# Patient Record
Sex: MALE | Race: WHITE | Employment: OTHER | URBAN - METROPOLITAN AREA
[De-identification: names, ages, dates, MRNs, and addresses within clinical notes are randomized per-mention and may not be internally consistent; named-entity substitution may affect disease eponyms.]

---

## 2020-10-02 ENCOUNTER — HOSPITAL ENCOUNTER (OUTPATIENT)
Dept: RADIATION ONCOLOGY | Age: 75
Discharge: HOME | End: 2020-10-02
Payer: MEDICARE

## 2020-10-02 VITALS
WEIGHT: 191.4 LBS | HEIGHT: 69 IN | RESPIRATION RATE: 16 BRPM | OXYGEN SATURATION: 96 % | BODY MASS INDEX: 28.35 KG/M2 | SYSTOLIC BLOOD PRESSURE: 155 MMHG | HEART RATE: 69 BPM | TEMPERATURE: 97.4 F | DIASTOLIC BLOOD PRESSURE: 78 MMHG

## 2020-10-02 PROBLEM — C61 ADENOCARCINOMA OF PROSTATE (HCC): Status: ACTIVE | Noted: 2020-10-02

## 2020-10-02 PROCEDURE — 99203 OFFICE O/P NEW LOW 30 MIN: CPT

## 2020-10-02 PROCEDURE — 99205 OFFICE O/P NEW HI 60 MIN: CPT | Performed by: RADIOLOGY

## 2020-10-02 ASSESSMENT — PAIN SCALES - GENERAL: PAINLEVEL_OUTOF10: 0

## 2020-10-02 NOTE — PROGRESS NOTES
Danielle Hernandez  10/2/2020    CONSULT / Prostate Cancer    Vitals:    10/02/20 0951   BP: (!) 155/78   Pulse: 69   Resp: 16   Temp: 97.4 °F (36.3 °C)   SpO2: 96%      Wt Readings from Last 3 Encounters:   10/02/20 191 lb 6.4 oz (86.8 kg)   03/02/15 190 lb 12.8 oz (86.5 kg)   02/25/15 185 lb (83.9 kg)        Pain Assessment  Pain Assessment: 0-10  Pain Level: 0  Patient's Stated Pain Goal: No pain  Multiple Pain Sites: No  Denies Need for Intervention    Fall Risk:    Not currently a fall risk  Re-Evaluate Weekly    Nutritional Alteration:  None  No Difficulties Swallowing  Voices Sufficient Oral Intake    Mediport: no    Pacemaker/ICD: no    Implants: no    Previous XRT: no    Assessment: Pt here to discuss radiation treatment options for prostate cancer. Pt reports elevated PSA, had biopsy 3/18/2019 with continued observation. Pt states PSA remained stable for a while then started to rise again. Pt had repeat biopsy 8/20/2020 and  sent here for tx options. Pt denies any urinary complaints, nocturia x 1 since bx. Reports daily bowel movements. Denies c/o pain. Spouse at bedside. Past Medical History:   Diagnosis Date    Cancer ([facility] [address]     basal cell    Hyperlipidemia        Past Surgical History:   Procedure Laterality Date    PROSTATE BIOPSY  03/18/2019    PROSTATE BIOPSY  08/20/2020    SKIN BIOPSY         No Known Allergies       Current Outpatient Medications:     Cholecalciferol (VITAMIN D3) 1.25 MG (00234 UT) TABS, Take 1 tablet by mouth daily, Disp: , Rfl:     simvastatin (ZOCOR) 80 MG tablet, Take 80 mg by mouth nightly., Disp: , Rfl:     aspirin 325 MG tablet, Take 325 mg by mouth daily. , Disp: , Rfl:     Omega-3 Fatty Acids (FISH OIL) 1000 MG CAPS, Take 3,000 mg by mouth daily. , Disp: , Rfl:     ADDITIONAL COMMENTS: Spouse at bedside    Electronically signed by Lindsey Dubin, RN on 10/2/2020 at 10:01 AM

## 2020-10-02 NOTE — PLAN OF CARE
Radiation education and handouts given. Side effects and management reviewed with pt. All questions answered and pt voices understanding . Nursing Care Plan for Prostate Radiation    Pain related to cancer diagnosis and treatment. Interventions   Pain assessment. Monitor pharmacological pain medication. Teach relaxation and repositioning techniques. Expected Outcome   Maintain patient's acceptable pain level or <5 on pain scale. Knowledge deficit related to diagnosis and treatment plan. Interventions   Assess patient's ability to comprehend diagnosis and treatment plan. Provide educational materials and teaching regarding plan of care. Provide emotional support and continued education. Refer to psychosocial coordinator if further assistance needed. Expected Outcome   Patient voices understanding of diagnosis and treatment plan. Altered skin integrity related to treatment. Interventions   Evaluation of skin integrity at therapy site. Advise patient on appropriate skin care. Instruct patient on recommended lotions/ointments/creams/dressing changes to use on therapy site. Expected Outcome   Minimize adverse skin reaction/infection at treatment site. Altered genitourinary function. Interventions   Evaluate for treatment side effects. Evaluate treatment modalities for effectiveness. Monitor I & O. Expected Outcome   Patient with minimal urinary complications as evidenced by adequate urinary output. Gastrointestinal disturbances due to treatment process. Interventions   Evaluate for treatment side effects. Evaluate treatment modalities for effectiveness. Initiate and educate on appropriate bowel program if needed. Expected Outcome   Patient with minimal bowel complications and controlled management of side effects. To re-evaluate during OTV and one month post radiation treatments.

## 2020-10-02 NOTE — PROGRESS NOTES
PROSTATE BIOPSY  03/18/2019    PROSTATE BIOPSY  08/20/2020    SKIN BIOPSY         History reviewed. No pertinent family history. Social History     Socioeconomic History    Marital status:      Spouse name: Not on file    Number of children: Not on file    Years of education: Not on file    Highest education level: Not on file   Occupational History    Not on file   Social Needs    Financial resource strain: Not on file    Food insecurity     Worry: Not on file     Inability: Not on file    Transportation needs     Medical: Not on file     Non-medical: Not on file   Tobacco Use    Smoking status: Never Smoker    Smokeless tobacco: Never Used   Substance and Sexual Activity    Alcohol use: Yes    Drug use: No    Sexual activity: Not on file   Lifestyle    Physical activity     Days per week: Not on file     Minutes per session: Not on file    Stress: Not on file   Relationships    Social connections     Talks on phone: Not on file     Gets together: Not on file     Attends [employer] service: Not on file     Active member of club or organization: Not on file     Attends meetings of clubs or organizations: Not on file     Relationship status: Not on file    Intimate partner violence     Fear of current or ex partner: Not on file     Emotionally abused: Not on file     Physically abused: Not on file     Forced sexual activity: Not on file   Other Topics Concern    Not on file   Social History Narrative    Not on file         ALLERGIES: No Known Allergies        Medication List      ASK your doctor about these medications     aspirin 325 MG tablet   fish oil 1000 MG Caps   simvastatin 80 MG tablet; Commonly known as: ZOCOR   Vitamin D3 1.25 MG (56631 UT) Tabs           REVIEW OF SYSTEMS:   Pertinents as in HPI, the remainder of the 14-point ROS is otherwise negative and has been signed and included in the medical record.       PHYSICAL EXAMINATION:   VITAL SIGNS: BP (!) 155/78   Pulse 69   Temp 97.4 °F (36.3 °C) (Tympanic)   Resp 16   Ht 5' 9\" (1.753 m)   Wt 191 lb 6.4 oz (86.8 kg)   SpO2 96%   BMI 28.26 kg/m²   KARNOFSKY PERFORMANCE STATUS: 100    PAIN RATIN    A&Ox3, NAD  Sclera anicteric, EOMI, no temporalis muscle wasting  No cervical, SCV, inguinofemoral LAD  Respirations unlabored  Abd soft, NTND, no HSM  No spinal, paraspinal, or other bony tenderness to palpation  No UE/LE edema  Normal mood, affect, judgement  : nl penis with bilaterally descended testes without masses, no inguinal hernias  Rectal: nl sphincter tone, prostate smooth, no rectal masses or blood      LABORATORY STUDIES:     PSA History:  4.8 -   5.6 -       RADIOLOGIC STUDIES:   None for review      ASSESSMENT/PLAN:  81yo man with favorable intermediate risk prostate cancer  We had a long discussion today regarding current diagnosis, prognosis, and treatment options. We reviewed NCCN guidelines and discussed continued active surveillance, EBRT vs Brachytherapy, and Surgical treatments. Patient wishes to have non-invasive treatment so we spent the majority of time discussing external radiation. I discussed the logistics of planning and delivering radiation treatments along with anticipated potential acute and chronic toxicities including but not limited to: Fatigue, skin erythema, desquamation, dysuria, hematuria, increased urinary urgency/frequency, nocturia, radiation proctitis, rectal bleeding, rectal injury, diarrhea, erectile dysfunction, long-term risks of bowel obstruction and fistula formation, risk of secondary malignancy. Questions answered to patient's satisfaction and they wish to proceed as planned. CT simulation scheduled in near future to begin planning process. Anticipated tx is 5400 cGy/30 fx to prostate/SV followed by a 2520 cGy/14 fx boost to the prostate alone using IMRT/IGRT with daily CBCT.     Thank-you for allowing me to participate in the care of this patient and please do not hesitate to contact me for any questions or concerns. TIME SPENT: 60 minutes spent during this consultation,  >50% spent in counseling/discussion/education.       Electronically signed by Electronically signed by Joaquina Fermin MD on 10/2/2020 at 10:48 AM

## 2020-10-07 ENCOUNTER — HOSPITAL ENCOUNTER (OUTPATIENT)
Dept: RADIATION ONCOLOGY | Age: 75
Discharge: HOME | End: 2020-10-07
Attending: RADIOLOGY
Payer: MEDICARE

## 2020-10-07 PROCEDURE — 77470 SPECIAL RADIATION TREATMENT: CPT | Performed by: RADIOLOGY

## 2020-10-07 PROCEDURE — 77290 THER RAD SIMULAJ FIELD CPLX: CPT | Performed by: RADIOLOGY

## 2020-10-07 PROCEDURE — 77334 RADIATION TREATMENT AID(S): CPT | Performed by: RADIOLOGY

## 2020-10-07 PROCEDURE — 99999 PR OFFICE/OUTPT VISIT,PROCEDURE ONLY: CPT | Performed by: RADIOLOGY

## 2020-10-07 PROCEDURE — 77263 THER RADIOLOGY TX PLNG CPLX: CPT | Performed by: RADIOLOGY

## 2020-10-12 PROCEDURE — 77301 RADIOTHERAPY DOSE PLAN IMRT: CPT | Performed by: RADIOLOGY

## 2020-10-12 PROCEDURE — 77338 DESIGN MLC DEVICE FOR IMRT: CPT | Performed by: RADIOLOGY

## 2020-10-15 ENCOUNTER — HOSPITAL ENCOUNTER (OUTPATIENT)
Dept: RADIATION ONCOLOGY | Age: 75
Discharge: HOME | End: 2020-10-15
Attending: RADIOLOGY
Payer: MEDICARE

## 2020-10-15 PROCEDURE — 77014 PR CT GUIDANCE PLACEMENT RAD THERAPY FIELDS: CPT | Performed by: RADIOLOGY

## 2020-10-15 PROCEDURE — 77385 HC NTSTY MODUL RAD TX DLVR SMPL: CPT | Performed by: RADIOLOGY

## 2020-10-15 PROCEDURE — 77300 RADIATION THERAPY DOSE PLAN: CPT | Performed by: RADIOLOGY

## 2020-10-16 ENCOUNTER — HOSPITAL ENCOUNTER (OUTPATIENT)
Dept: RADIATION ONCOLOGY | Age: 75
Discharge: HOME | End: 2020-10-16
Attending: RADIOLOGY
Payer: MEDICARE

## 2020-10-16 PROCEDURE — 77385 HC NTSTY MODUL RAD TX DLVR SMPL: CPT | Performed by: RADIOLOGY

## 2020-10-16 PROCEDURE — 77014 PR CT GUIDANCE PLACEMENT RAD THERAPY FIELDS: CPT | Performed by: RADIOLOGY

## 2020-10-19 ENCOUNTER — HOSPITAL ENCOUNTER (OUTPATIENT)
Dept: RADIATION ONCOLOGY | Age: 75
Discharge: HOME | End: 2020-10-19
Attending: RADIOLOGY
Payer: MEDICARE

## 2020-10-19 VITALS — BODY MASS INDEX: 28.12 KG/M2 | WEIGHT: 190.4 LBS

## 2020-10-19 PROCEDURE — 77385 HC NTSTY MODUL RAD TX DLVR SMPL: CPT | Performed by: RADIOLOGY

## 2020-10-19 PROCEDURE — 77014 PR CT GUIDANCE PLACEMENT RAD THERAPY FIELDS: CPT | Performed by: RADIOLOGY

## 2020-10-19 ASSESSMENT — PAIN SCALES - GENERAL: PAINLEVEL_OUTOF10: 0

## 2020-10-20 ENCOUNTER — HOSPITAL ENCOUNTER (OUTPATIENT)
Dept: RADIATION ONCOLOGY | Age: 75
Discharge: HOME | End: 2020-10-20
Attending: RADIOLOGY
Payer: MEDICARE

## 2020-10-20 PROCEDURE — 77014 PR CT GUIDANCE PLACEMENT RAD THERAPY FIELDS: CPT | Performed by: RADIOLOGY

## 2020-10-20 PROCEDURE — 77385 HC NTSTY MODUL RAD TX DLVR SMPL: CPT | Performed by: RADIOLOGY

## 2020-10-21 ENCOUNTER — HOSPITAL ENCOUNTER (OUTPATIENT)
Dept: RADIATION ONCOLOGY | Age: 75
Discharge: HOME | End: 2020-10-21
Attending: RADIOLOGY
Payer: MEDICARE

## 2020-10-21 PROCEDURE — 77336 RADIATION PHYSICS CONSULT: CPT | Performed by: RADIOLOGY

## 2020-10-21 PROCEDURE — 77014 PR CT GUIDANCE PLACEMENT RAD THERAPY FIELDS: CPT | Performed by: RADIOLOGY

## 2020-10-21 PROCEDURE — 77385 HC NTSTY MODUL RAD TX DLVR SMPL: CPT | Performed by: RADIOLOGY

## 2020-10-21 PROCEDURE — 77427 RADIATION TX MANAGEMENT X5: CPT | Performed by: RADIOLOGY

## 2020-10-22 ENCOUNTER — HOSPITAL ENCOUNTER (OUTPATIENT)
Dept: RADIATION ONCOLOGY | Age: 75
Discharge: HOME | End: 2020-10-22
Attending: RADIOLOGY
Payer: MEDICARE

## 2020-10-22 PROCEDURE — 77385 HC NTSTY MODUL RAD TX DLVR SMPL: CPT | Performed by: RADIOLOGY

## 2020-10-22 PROCEDURE — 77014 PR CT GUIDANCE PLACEMENT RAD THERAPY FIELDS: CPT | Performed by: RADIOLOGY

## 2020-10-23 ENCOUNTER — HOSPITAL ENCOUNTER (OUTPATIENT)
Dept: RADIATION ONCOLOGY | Age: 75
Discharge: HOME | End: 2020-10-23
Attending: RADIOLOGY
Payer: MEDICARE

## 2020-10-23 PROCEDURE — 77014 PR CT GUIDANCE PLACEMENT RAD THERAPY FIELDS: CPT | Performed by: RADIOLOGY

## 2020-10-23 PROCEDURE — 77385 HC NTSTY MODUL RAD TX DLVR SMPL: CPT | Performed by: RADIOLOGY

## 2020-10-26 ENCOUNTER — HOSPITAL ENCOUNTER (OUTPATIENT)
Dept: RADIATION ONCOLOGY | Age: 75
Discharge: HOME | End: 2020-10-26
Attending: RADIOLOGY
Payer: MEDICARE

## 2020-10-26 VITALS — BODY MASS INDEX: 27.73 KG/M2 | WEIGHT: 187.8 LBS

## 2020-10-26 PROCEDURE — 77014 PR CT GUIDANCE PLACEMENT RAD THERAPY FIELDS: CPT | Performed by: RADIOLOGY

## 2020-10-26 PROCEDURE — 77385 HC NTSTY MODUL RAD TX DLVR SMPL: CPT | Performed by: RADIOLOGY

## 2020-10-26 ASSESSMENT — PAIN SCALES - GENERAL: PAINLEVEL_OUTOF10: 0

## 2020-10-26 NOTE — PROGRESS NOTES
Weekly Radiation Treatment Progress Note    DATE OF SERVICE: 10/26/2020     DIAGNOSIS:  Cancer Staging  Adenocarcinoma of prostate [facility])  Staging form: Prostate, AJCC 8th Edition  - Clinical: Stage IIB (cT1c, cN0, cM0, PSA: 5.6, Grade Group: 2) - Unsigned       TREATMENT COURSE:   Oncology History   Adenocarcinoma of prostate ([facility] [address]   3/18/2019 Initial Diagnosis    Adenocarcinoma of prostate (HCC)    TRUS Bx  [place] 3+3=6 adenocarcinoma, 5/12 cores, 6% submitted tissue     8/20/2020 Biopsy    TRUS Bx  Single core with [place] 3+4=7 adenocarcinoma at right lateral base  Remainder showing [place] 3+3=6  6/15 cores, 8% submitted tissue       10/15/2020 -  Radiation    Prostate/SV  Dual Arc VMAT-IMRT using 6MV photons  180 cGy x 30 = 5400 cGy    Prostate [place]  Dual arc VMAT-IMRT using 6MV photons  180 cGy x 14 = 2520 cGy (7920 cGy cumulative dose)           Site: Prostate  Current Radiation Dose: 1440 cGy    Subjective: Increased urinary frequency through the day, nocturia 2-3x/night (baseline is 1x), and hesitancy  No dysuria  No diarrhea    EXAM  There were no vitals filed for this visit.   NAD    Setup images, chart, plan reviewed      A/P:   Tolerating tx well  Continue RT as planned    Pt does not want medical intervention for minor symptoms at this time      Electronically signed by Hoa Ernst MD on 10/26/2020 at 11:42 AM

## 2020-10-27 ENCOUNTER — HOSPITAL ENCOUNTER (OUTPATIENT)
Dept: RADIATION ONCOLOGY | Age: 75
Discharge: HOME | End: 2020-10-27
Attending: RADIOLOGY
Payer: MEDICARE

## 2020-10-27 PROCEDURE — 77385 HC NTSTY MODUL RAD TX DLVR SMPL: CPT | Performed by: RADIOLOGY

## 2020-10-27 PROCEDURE — 77014 PR CT GUIDANCE PLACEMENT RAD THERAPY FIELDS: CPT | Performed by: RADIOLOGY

## 2020-10-28 ENCOUNTER — HOSPITAL ENCOUNTER (OUTPATIENT)
Dept: RADIATION ONCOLOGY | Age: 75
Discharge: HOME | End: 2020-10-28
Attending: RADIOLOGY
Payer: MEDICARE

## 2020-10-28 PROCEDURE — 77014 PR CT GUIDANCE PLACEMENT RAD THERAPY FIELDS: CPT | Performed by: RADIOLOGY

## 2020-10-28 PROCEDURE — 77427 RADIATION TX MANAGEMENT X5: CPT | Performed by: RADIOLOGY

## 2020-10-28 PROCEDURE — 77385 HC NTSTY MODUL RAD TX DLVR SMPL: CPT | Performed by: RADIOLOGY

## 2020-10-28 PROCEDURE — 77336 RADIATION PHYSICS CONSULT: CPT | Performed by: RADIOLOGY

## 2020-10-29 ENCOUNTER — HOSPITAL ENCOUNTER (OUTPATIENT)
Dept: RADIATION ONCOLOGY | Age: 75
Discharge: HOME | End: 2020-10-29
Attending: RADIOLOGY
Payer: MEDICARE

## 2020-10-29 PROCEDURE — 77014 PR CT GUIDANCE PLACEMENT RAD THERAPY FIELDS: CPT | Performed by: RADIOLOGY

## 2020-10-29 PROCEDURE — 77385 HC NTSTY MODUL RAD TX DLVR SMPL: CPT | Performed by: RADIOLOGY

## 2020-10-30 ENCOUNTER — HOSPITAL ENCOUNTER (OUTPATIENT)
Dept: RADIATION ONCOLOGY | Age: 75
Discharge: HOME | End: 2020-10-30
Attending: RADIOLOGY
Payer: MEDICARE

## 2020-10-30 PROCEDURE — 77385 HC NTSTY MODUL RAD TX DLVR SMPL: CPT | Performed by: RADIOLOGY

## 2020-10-30 PROCEDURE — 77014 PR CT GUIDANCE PLACEMENT RAD THERAPY FIELDS: CPT | Performed by: RADIOLOGY

## 2020-11-02 ENCOUNTER — HOSPITAL ENCOUNTER (OUTPATIENT)
Dept: RADIATION ONCOLOGY | Age: 75
Discharge: HOME | End: 2020-11-02
Attending: RADIOLOGY
Payer: MEDICARE

## 2020-11-02 VITALS — BODY MASS INDEX: 27.85 KG/M2 | WEIGHT: 188.6 LBS

## 2020-11-02 PROCEDURE — 77014 PR CT GUIDANCE PLACEMENT RAD THERAPY FIELDS: CPT | Performed by: RADIOLOGY

## 2020-11-02 PROCEDURE — 77385 HC NTSTY MODUL RAD TX DLVR SMPL: CPT | Performed by: RADIOLOGY

## 2020-11-02 ASSESSMENT — PAIN SCALES - GENERAL: PAINLEVEL_OUTOF10: 0

## 2020-11-02 NOTE — PROGRESS NOTES
Weekly Radiation Treatment Progress Note    DATE OF SERVICE: 11/2/2020     DIAGNOSIS:  Cancer Staging  Adenocarcinoma of prostate [facility])  Staging form: Prostate, AJCC 8th Edition  - Clinical: Stage IIB (cT1c, cN0, cM0, PSA: 5.6, Grade Group: 2) - Unsigned       TREATMENT COURSE:   Oncology History   Adenocarcinoma of prostate ([facility] [address]   3/18/2019 Initial Diagnosis    Adenocarcinoma of prostate (HCC)    TRUS Bx  [place] 3+3=6 adenocarcinoma, 5/12 cores, 6% submitted tissue     8/20/2020 Biopsy    TRUS Bx  Single core with [place] 3+4=7 adenocarcinoma at right lateral base  Remainder showing [place] 3+3=6  6/15 cores, 8% submitted tissue       10/15/2020 -  Radiation    Prostate/SV  Dual Arc VMAT-IMRT using 6MV photons  180 cGy x 30 = 5400 cGy    Prostate [place]  Dual arc VMAT-IMRT using 6MV photons  180 cGy x 14 = 2520 cGy (7920 cGy cumulative dose)           Site: prostate  Current Radiation Dose: 2340 cGy    Subjective:    Weak urinary stream but not bothersome  No other LUTS      EXAM  There were no vitals filed for this visit.   NAD    Setup images, chart, plan reviewed      A/P:   Tolerating tx well  Continue RT as planned    Discussed flomax but he does not want to start additional meds at this point      Electronically signed by Ryne Amezquita MD on 11/2/2020 at 11:45 AM

## 2020-11-03 ENCOUNTER — HOSPITAL ENCOUNTER (OUTPATIENT)
Dept: RADIATION ONCOLOGY | Age: 75
Discharge: HOME | End: 2020-11-03
Attending: RADIOLOGY
Payer: MEDICARE

## 2020-11-03 PROCEDURE — 77385 HC NTSTY MODUL RAD TX DLVR SMPL: CPT | Performed by: RADIOLOGY

## 2020-11-03 PROCEDURE — 77014 PR CT GUIDANCE PLACEMENT RAD THERAPY FIELDS: CPT | Performed by: RADIOLOGY

## 2020-11-04 ENCOUNTER — HOSPITAL ENCOUNTER (OUTPATIENT)
Dept: RADIATION ONCOLOGY | Age: 75
Discharge: HOME | End: 2020-11-04
Attending: RADIOLOGY
Payer: MEDICARE

## 2020-11-04 PROCEDURE — 77385 HC NTSTY MODUL RAD TX DLVR SMPL: CPT | Performed by: RADIOLOGY

## 2020-11-04 PROCEDURE — 77336 RADIATION PHYSICS CONSULT: CPT | Performed by: RADIOLOGY

## 2020-11-04 PROCEDURE — 77014 PR CT GUIDANCE PLACEMENT RAD THERAPY FIELDS: CPT | Performed by: RADIOLOGY

## 2020-11-04 PROCEDURE — 77427 RADIATION TX MANAGEMENT X5: CPT | Performed by: RADIOLOGY

## 2020-11-05 ENCOUNTER — HOSPITAL ENCOUNTER (OUTPATIENT)
Dept: RADIATION ONCOLOGY | Age: 75
Discharge: HOME | End: 2020-11-05
Attending: RADIOLOGY
Payer: MEDICARE

## 2020-11-05 PROCEDURE — 77385 HC NTSTY MODUL RAD TX DLVR SMPL: CPT | Performed by: RADIOLOGY

## 2020-11-05 PROCEDURE — 77014 PR CT GUIDANCE PLACEMENT RAD THERAPY FIELDS: CPT | Performed by: RADIOLOGY

## 2020-11-06 ENCOUNTER — HOSPITAL ENCOUNTER (OUTPATIENT)
Dept: RADIATION ONCOLOGY | Age: 75
Discharge: HOME | End: 2020-11-06
Attending: RADIOLOGY
Payer: MEDICARE

## 2020-11-06 PROCEDURE — 77385 HC NTSTY MODUL RAD TX DLVR SMPL: CPT | Performed by: RADIOLOGY

## 2020-11-06 PROCEDURE — 77014 PR CT GUIDANCE PLACEMENT RAD THERAPY FIELDS: CPT | Performed by: RADIOLOGY

## 2020-11-09 ENCOUNTER — HOSPITAL ENCOUNTER (OUTPATIENT)
Dept: RADIATION ONCOLOGY | Age: 75
Discharge: HOME | End: 2020-11-09
Attending: RADIOLOGY
Payer: MEDICARE

## 2020-11-09 VITALS — WEIGHT: 187 LBS | BODY MASS INDEX: 27.62 KG/M2

## 2020-11-09 PROCEDURE — 77385 HC NTSTY MODUL RAD TX DLVR SMPL: CPT | Performed by: RADIOLOGY

## 2020-11-09 PROCEDURE — 77014 PR CT GUIDANCE PLACEMENT RAD THERAPY FIELDS: CPT | Performed by: RADIOLOGY

## 2020-11-09 ASSESSMENT — PAIN SCALES - GENERAL: PAINLEVEL_OUTOF10: 0

## 2020-11-09 NOTE — PROGRESS NOTES
Weekly Radiation Treatment Progress Note    DATE OF SERVICE: 11/9/2020     DIAGNOSIS:  Cancer Staging  Adenocarcinoma of prostate [facility])  Staging form: Prostate, AJCC 8th Edition  - Clinical: Stage IIB (cT1c, cN0, cM0, PSA: 5.6, Grade Group: 2) - Unsigned       TREATMENT COURSE:   Oncology History   Adenocarcinoma of prostate ([facility] [address]   3/18/2019 Initial Diagnosis    Adenocarcinoma of prostate (HCC)    TRUS Bx  [place] 3+3=6 adenocarcinoma, 5/12 cores, 6% submitted tissue     8/20/2020 Biopsy    TRUS Bx  Single core with [place] 3+4=7 adenocarcinoma at right lateral base  Remainder showing [place] 3+3=6  6/15 cores, 8% submitted tissue       10/15/2020 -  Radiation    Prostate/SV  Dual Arc VMAT-IMRT using 6MV photons  180 cGy x 30 = 5400 cGy    Prostate [place]  Dual arc VMAT-IMRT using 6MV photons  180 cGy x 14 = 2520 cGy (7920 cGy cumulative dose)           Site: Prostate/SV  Current Radiation Dose: 3240 cGy    Subjective:    Doing well without complaints including LUTS/diarrhea/pelvic pain    EXAM  There were no vitals filed for this visit.   NAD    Setup images, chart, plan reviewed    A/P:   Tolerating tx well  Continue RT as planned      Electronically signed by Silvia Salcido MD on 11/9/2020 at 11:36 AM

## 2020-11-10 ENCOUNTER — HOSPITAL ENCOUNTER (OUTPATIENT)
Dept: RADIATION ONCOLOGY | Age: 75
Discharge: HOME | End: 2020-11-10
Attending: RADIOLOGY
Payer: MEDICARE

## 2020-11-10 PROCEDURE — 77385 HC NTSTY MODUL RAD TX DLVR SMPL: CPT | Performed by: RADIOLOGY

## 2020-11-10 PROCEDURE — 77014 PR CT GUIDANCE PLACEMENT RAD THERAPY FIELDS: CPT | Performed by: RADIOLOGY

## 2020-11-11 ENCOUNTER — HOSPITAL ENCOUNTER (OUTPATIENT)
Dept: RADIATION ONCOLOGY | Age: 75
Discharge: HOME | End: 2020-11-11
Attending: RADIOLOGY
Payer: MEDICARE

## 2020-11-11 PROCEDURE — 77014 PR CT GUIDANCE PLACEMENT RAD THERAPY FIELDS: CPT | Performed by: RADIOLOGY

## 2020-11-11 PROCEDURE — 77385 HC NTSTY MODUL RAD TX DLVR SMPL: CPT | Performed by: RADIOLOGY

## 2020-11-11 PROCEDURE — 77336 RADIATION PHYSICS CONSULT: CPT | Performed by: RADIOLOGY

## 2020-11-11 PROCEDURE — 77427 RADIATION TX MANAGEMENT X5: CPT | Performed by: RADIOLOGY

## 2020-11-12 ENCOUNTER — HOSPITAL ENCOUNTER (OUTPATIENT)
Dept: RADIATION ONCOLOGY | Age: 75
Discharge: HOME | End: 2020-11-12
Attending: RADIOLOGY
Payer: MEDICARE

## 2020-11-12 PROCEDURE — 77385 HC NTSTY MODUL RAD TX DLVR SMPL: CPT | Performed by: RADIOLOGY

## 2020-11-12 PROCEDURE — 77014 PR CT GUIDANCE PLACEMENT RAD THERAPY FIELDS: CPT | Performed by: RADIOLOGY

## 2020-11-13 ENCOUNTER — HOSPITAL ENCOUNTER (OUTPATIENT)
Dept: RADIATION ONCOLOGY | Age: 75
Discharge: HOME | End: 2020-11-13
Attending: RADIOLOGY
Payer: MEDICARE

## 2020-11-13 PROCEDURE — 77014 PR CT GUIDANCE PLACEMENT RAD THERAPY FIELDS: CPT | Performed by: RADIOLOGY

## 2020-11-13 PROCEDURE — 77385 HC NTSTY MODUL RAD TX DLVR SMPL: CPT | Performed by: RADIOLOGY

## 2020-11-16 ENCOUNTER — HOSPITAL ENCOUNTER (OUTPATIENT)
Dept: RADIATION ONCOLOGY | Age: 75
Discharge: HOME | End: 2020-11-16
Attending: RADIOLOGY
Payer: MEDICARE

## 2020-11-16 VITALS — BODY MASS INDEX: 27.32 KG/M2 | WEIGHT: 185 LBS

## 2020-11-16 PROCEDURE — 77385 HC NTSTY MODUL RAD TX DLVR SMPL: CPT | Performed by: RADIOLOGY

## 2020-11-16 PROCEDURE — 77014 PR CT GUIDANCE PLACEMENT RAD THERAPY FIELDS: CPT | Performed by: RADIOLOGY

## 2020-11-16 ASSESSMENT — PAIN SCALES - GENERAL: PAINLEVEL_OUTOF10: 0

## 2020-11-16 NOTE — PROGRESS NOTES
Weekly Radiation Treatment Progress Note    DATE OF SERVICE: 11/16/2020     DIAGNOSIS:  Cancer Staging  Adenocarcinoma of prostate [facility])  Staging form: Prostate, AJCC 8th Edition  - Clinical: Stage IIB (cT1c, cN0, cM0, PSA: 5.6, Grade Group: 2) - Unsigned       TREATMENT COURSE:   Oncology History   Adenocarcinoma of prostate ([facility] [address]   3/18/2019 Initial Diagnosis    Adenocarcinoma of prostate (HCC)    TRUS Bx  [place] 3+3=6 adenocarcinoma, 5/12 cores, 6% submitted tissue     8/20/2020 Biopsy    TRUS Bx  Single core with [place] 3+4=7 adenocarcinoma at right lateral base  Remainder showing [place] 3+3=6  6/15 cores, 8% submitted tissue       10/15/2020 -  Radiation    Prostate/SV  Dual Arc VMAT-IMRT using 6MV photons  180 cGy x 30 = 5400 cGy    Prostate [place]  Dual arc VMAT-IMRT using 6MV photons  180 cGy x 14 = 2520 cGy (7920 cGy cumulative dose)           Site: Prostate  Current Total Radiation Dose: 4140 cGy    Pt doing well. Energy fairly good. Mild dysuria +/-. No hematuria.  Up 2-3x/pm  No diarrhea    EXAM  Wt Readings from Last 3 Encounters:   11/16/20 185 lb (83.9 kg)   11/09/20 187 lb (84.8 kg)   11/02/20 188 lb 9.6 oz (85.5 kg)     NAD    Setup images, chart, plan reviewed    A/P:   Tolerating RT well  Continue RT as planned      Electronically signed by Paul Spencer MD on 11/16/2020 at 11:33 AM

## 2020-11-17 ENCOUNTER — HOSPITAL ENCOUNTER (OUTPATIENT)
Dept: RADIATION ONCOLOGY | Age: 75
Discharge: HOME | End: 2020-11-17
Attending: RADIOLOGY
Payer: MEDICARE

## 2020-11-17 PROCEDURE — 77014 PR CT GUIDANCE PLACEMENT RAD THERAPY FIELDS: CPT | Performed by: RADIOLOGY

## 2020-11-17 PROCEDURE — 77385 HC NTSTY MODUL RAD TX DLVR SMPL: CPT | Performed by: RADIOLOGY

## 2020-11-18 ENCOUNTER — HOSPITAL ENCOUNTER (OUTPATIENT)
Dept: RADIATION ONCOLOGY | Age: 75
Discharge: HOME | End: 2020-11-18
Attending: RADIOLOGY
Payer: MEDICARE

## 2020-11-18 PROCEDURE — 77385 HC NTSTY MODUL RAD TX DLVR SMPL: CPT | Performed by: RADIOLOGY

## 2020-11-18 PROCEDURE — 77336 RADIATION PHYSICS CONSULT: CPT | Performed by: RADIOLOGY

## 2020-11-18 PROCEDURE — 77427 RADIATION TX MANAGEMENT X5: CPT | Performed by: RADIOLOGY

## 2020-11-18 PROCEDURE — 77014 PR CT GUIDANCE PLACEMENT RAD THERAPY FIELDS: CPT | Performed by: RADIOLOGY

## 2020-11-19 ENCOUNTER — HOSPITAL ENCOUNTER (OUTPATIENT)
Dept: RADIATION ONCOLOGY | Age: 75
Discharge: HOME | End: 2020-11-19
Attending: RADIOLOGY
Payer: MEDICARE

## 2020-11-19 PROCEDURE — 77385 HC NTSTY MODUL RAD TX DLVR SMPL: CPT | Performed by: RADIOLOGY

## 2020-11-19 PROCEDURE — 77014 PR CT GUIDANCE PLACEMENT RAD THERAPY FIELDS: CPT | Performed by: RADIOLOGY

## 2020-11-20 ENCOUNTER — HOSPITAL ENCOUNTER (OUTPATIENT)
Dept: RADIATION ONCOLOGY | Age: 75
Discharge: HOME | End: 2020-11-20
Attending: RADIOLOGY
Payer: MEDICARE

## 2020-11-20 PROCEDURE — 77014 PR CT GUIDANCE PLACEMENT RAD THERAPY FIELDS: CPT | Performed by: RADIOLOGY

## 2020-11-20 PROCEDURE — 77338 DESIGN MLC DEVICE FOR IMRT: CPT | Performed by: RADIOLOGY

## 2020-11-20 PROCEDURE — 77385 HC NTSTY MODUL RAD TX DLVR SMPL: CPT | Performed by: RADIOLOGY

## 2020-11-23 ENCOUNTER — HOSPITAL ENCOUNTER (OUTPATIENT)
Dept: RADIATION ONCOLOGY | Age: 75
Discharge: HOME | End: 2020-11-23
Attending: RADIOLOGY
Payer: MEDICARE

## 2020-11-23 VITALS — WEIGHT: 186.8 LBS | BODY MASS INDEX: 27.59 KG/M2

## 2020-11-23 PROCEDURE — 77300 RADIATION THERAPY DOSE PLAN: CPT | Performed by: RADIOLOGY

## 2020-11-23 PROCEDURE — 77014 PR CT GUIDANCE PLACEMENT RAD THERAPY FIELDS: CPT | Performed by: RADIOLOGY

## 2020-11-23 PROCEDURE — 77338 DESIGN MLC DEVICE FOR IMRT: CPT | Performed by: RADIOLOGY

## 2020-11-23 PROCEDURE — 77385 HC NTSTY MODUL RAD TX DLVR SMPL: CPT | Performed by: RADIOLOGY

## 2020-11-23 ASSESSMENT — PAIN SCALES - GENERAL: PAINLEVEL_OUTOF10: 0

## 2020-11-23 NOTE — PROGRESS NOTES
Weekly Radiation Treatment Progress Note    DATE OF SERVICE: 11/23/2020     DIAGNOSIS:  Cancer Staging  Adenocarcinoma of prostate [facility])  Staging form: Prostate, AJCC 8th Edition  - Clinical: Stage IIB (cT1c, cN0, cM0, PSA: 5.6, Grade Group: 2) - Unsigned       TREATMENT COURSE:   Oncology History   Adenocarcinoma of prostate ([facility] [address]   3/18/2019 Initial Diagnosis    Adenocarcinoma of prostate (HCC)    TRUS Bx  [place] 3+3=6 adenocarcinoma, 5/12 cores, 6% submitted tissue     8/20/2020 Biopsy    TRUS Bx  Single core with [place] 3+4=7 adenocarcinoma at right lateral base  Remainder showing [place] 3+3=6  6/15 cores, 8% submitted tissue       10/15/2020 -  Radiation    Prostate/SV  Dual Arc VMAT-IMRT using 6MV photons  180 cGy x 30 = 5400 cGy    Prostate [place]  Dual arc VMAT-IMRT using 6MV photons  180 cGy x 14 = 2520 cGy (7920 cGy cumulative dose)           Site: Prostate/SV  Current Radiation Dose: 5040 cGy    Subjective:    Doing well, stable nocturia 2x/night  No other complaints    EXAM  There were no vitals filed for this visit.   NAD    Setup images, chart, plan reviewed    A/P:   Tolerating tx well  Continue RT as planned      Electronically signed by Tylor Christina MD on 11/23/2020 at 11:41 AM

## 2020-11-24 ENCOUNTER — HOSPITAL ENCOUNTER (OUTPATIENT)
Dept: RADIATION ONCOLOGY | Age: 75
Discharge: HOME | End: 2020-11-24
Attending: RADIOLOGY
Payer: MEDICARE

## 2020-11-24 PROCEDURE — 77014 PR CT GUIDANCE PLACEMENT RAD THERAPY FIELDS: CPT | Performed by: RADIOLOGY

## 2020-11-24 PROCEDURE — 77385 HC NTSTY MODUL RAD TX DLVR SMPL: CPT | Performed by: RADIOLOGY

## 2020-11-25 ENCOUNTER — HOSPITAL ENCOUNTER (OUTPATIENT)
Dept: RADIATION ONCOLOGY | Age: 75
Discharge: HOME | End: 2020-11-25
Attending: RADIOLOGY
Payer: MEDICARE

## 2020-11-25 PROCEDURE — 77336 RADIATION PHYSICS CONSULT: CPT | Performed by: RADIOLOGY

## 2020-11-25 PROCEDURE — 77385 HC NTSTY MODUL RAD TX DLVR SMPL: CPT | Performed by: RADIOLOGY

## 2020-11-25 PROCEDURE — 77014 PR CT GUIDANCE PLACEMENT RAD THERAPY FIELDS: CPT | Performed by: RADIOLOGY

## 2020-11-25 PROCEDURE — 77427 RADIATION TX MANAGEMENT X5: CPT | Performed by: RADIOLOGY

## 2020-11-27 ENCOUNTER — APPOINTMENT (OUTPATIENT)
Dept: RADIATION ONCOLOGY | Age: 75
End: 2020-11-27
Attending: RADIOLOGY
Payer: MEDICARE

## 2020-11-30 ENCOUNTER — HOSPITAL ENCOUNTER (OUTPATIENT)
Dept: RADIATION ONCOLOGY | Age: 75
Discharge: HOME | End: 2020-11-30
Attending: RADIOLOGY
Payer: MEDICARE

## 2020-11-30 PROCEDURE — 77385 HC NTSTY MODUL RAD TX DLVR SMPL: CPT | Performed by: RADIOLOGY

## 2020-11-30 PROCEDURE — 77300 RADIATION THERAPY DOSE PLAN: CPT | Performed by: RADIOLOGY

## 2020-11-30 PROCEDURE — 77014 PR CT GUIDANCE PLACEMENT RAD THERAPY FIELDS: CPT | Performed by: RADIOLOGY

## 2020-12-01 ENCOUNTER — HOSPITAL ENCOUNTER (OUTPATIENT)
Dept: RADIATION ONCOLOGY | Age: 75
Discharge: HOME | End: 2020-12-01
Attending: RADIOLOGY
Payer: MEDICARE

## 2020-12-01 PROCEDURE — 77014 PR CT GUIDANCE PLACEMENT RAD THERAPY FIELDS: CPT | Performed by: RADIOLOGY

## 2020-12-01 PROCEDURE — 77385 HC NTSTY MODUL RAD TX DLVR SMPL: CPT | Performed by: RADIOLOGY

## 2020-12-02 ENCOUNTER — HOSPITAL ENCOUNTER (OUTPATIENT)
Dept: RADIATION ONCOLOGY | Age: 75
Discharge: HOME | End: 2020-12-02
Attending: RADIOLOGY
Payer: MEDICARE

## 2020-12-02 PROCEDURE — 77385 HC NTSTY MODUL RAD TX DLVR SMPL: CPT | Performed by: RADIOLOGY

## 2020-12-02 PROCEDURE — 77014 PR CT GUIDANCE PLACEMENT RAD THERAPY FIELDS: CPT | Performed by: RADIOLOGY

## 2020-12-03 ENCOUNTER — HOSPITAL ENCOUNTER (OUTPATIENT)
Dept: RADIATION ONCOLOGY | Age: 75
Discharge: HOME | End: 2020-12-03
Attending: RADIOLOGY
Payer: MEDICARE

## 2020-12-03 PROCEDURE — 77014 PR CT GUIDANCE PLACEMENT RAD THERAPY FIELDS: CPT | Performed by: RADIOLOGY

## 2020-12-03 PROCEDURE — 77385 HC NTSTY MODUL RAD TX DLVR SMPL: CPT | Performed by: RADIOLOGY

## 2020-12-04 ENCOUNTER — HOSPITAL ENCOUNTER (OUTPATIENT)
Dept: RADIATION ONCOLOGY | Age: 75
Discharge: HOME | End: 2020-12-04
Attending: RADIOLOGY
Payer: MEDICARE

## 2020-12-04 PROCEDURE — 77427 RADIATION TX MANAGEMENT X5: CPT | Performed by: RADIOLOGY

## 2020-12-04 PROCEDURE — 77336 RADIATION PHYSICS CONSULT: CPT | Performed by: RADIOLOGY

## 2020-12-04 PROCEDURE — 77385 HC NTSTY MODUL RAD TX DLVR SMPL: CPT | Performed by: RADIOLOGY

## 2020-12-04 PROCEDURE — 77014 PR CT GUIDANCE PLACEMENT RAD THERAPY FIELDS: CPT | Performed by: RADIOLOGY

## 2020-12-07 ENCOUNTER — HOSPITAL ENCOUNTER (OUTPATIENT)
Dept: RADIATION ONCOLOGY | Age: 75
Discharge: HOME | End: 2020-12-07
Attending: RADIOLOGY
Payer: MEDICARE

## 2020-12-07 VITALS — WEIGHT: 185.8 LBS | BODY MASS INDEX: 27.44 KG/M2

## 2020-12-07 PROCEDURE — 77385 HC NTSTY MODUL RAD TX DLVR SMPL: CPT | Performed by: RADIOLOGY

## 2020-12-07 PROCEDURE — 77014 PR CT GUIDANCE PLACEMENT RAD THERAPY FIELDS: CPT | Performed by: RADIOLOGY

## 2020-12-07 ASSESSMENT — PAIN SCALES - GENERAL: PAINLEVEL_OUTOF10: 0

## 2020-12-07 NOTE — PLAN OF CARE
Care plan reviewed, no changes noted. _ reports nocturia 2 times per night but this is not new for him.

## 2020-12-07 NOTE — PROGRESS NOTES
Weekly Radiation Treatment Progress Note    DATE OF SERVICE: 12/7/2020     DIAGNOSIS:  Cancer Staging  Adenocarcinoma of prostate [facility])  Staging form: Prostate, AJCC 8th Edition  - Clinical: Stage IIB (cT1c, cN0, cM0, PSA: 5.6, Grade Group: 2) - Unsigned       TREATMENT COURSE:   Oncology History   Adenocarcinoma of prostate ([facility] [address]   3/18/2019 Initial Diagnosis    Adenocarcinoma of prostate (HCC)    TRUS Bx  [place] 3+3=6 adenocarcinoma, 5/12 cores, 6% submitted tissue     8/20/2020 Biopsy    TRUS Bx  Single core with [place] 3+4=7 adenocarcinoma at right lateral base  Remainder showing [place] 3+3=6  6/15 cores, 8% submitted tissue       10/15/2020 -  Radiation    Prostate/SV  Dual Arc VMAT-IMRT using 6MV photons  180 cGy x 30 = 5400 cGy    Prostate [place]  Dual arc VMAT-IMRT using 6MV photons  180 cGy x 14 = 2520 cGy (7920 cGy cumulative dose)           Site: prostate  Current Radiation Dose: 6480 cGy    Subjective:    Doing well with stable nocturia 2x/night    EXAM  There were no vitals filed for this visit.   NAD    Setup images, chart, plan reviewed    A/P:   Tolerating tx well  Continue RT as planned      Electronically signed by Demarcus Eng MD on 12/7/2020 at 11:54 AM

## 2020-12-08 ENCOUNTER — HOSPITAL ENCOUNTER (OUTPATIENT)
Dept: RADIATION ONCOLOGY | Age: 75
Discharge: HOME | End: 2020-12-08
Attending: RADIOLOGY
Payer: MEDICARE

## 2020-12-08 PROCEDURE — 77385 HC NTSTY MODUL RAD TX DLVR SMPL: CPT | Performed by: RADIOLOGY

## 2020-12-08 PROCEDURE — 77014 PR CT GUIDANCE PLACEMENT RAD THERAPY FIELDS: CPT | Performed by: RADIOLOGY

## 2020-12-09 ENCOUNTER — HOSPITAL ENCOUNTER (OUTPATIENT)
Dept: RADIATION ONCOLOGY | Age: 75
Discharge: HOME | End: 2020-12-09
Attending: RADIOLOGY
Payer: MEDICARE

## 2020-12-09 PROCEDURE — 77385 HC NTSTY MODUL RAD TX DLVR SMPL: CPT | Performed by: RADIOLOGY

## 2020-12-09 PROCEDURE — 77014 PR CT GUIDANCE PLACEMENT RAD THERAPY FIELDS: CPT | Performed by: RADIOLOGY

## 2020-12-10 ENCOUNTER — HOSPITAL ENCOUNTER (OUTPATIENT)
Dept: RADIATION ONCOLOGY | Age: 75
Discharge: HOME | End: 2020-12-10
Attending: RADIOLOGY
Payer: MEDICARE

## 2020-12-10 PROCEDURE — 77014 PR CT GUIDANCE PLACEMENT RAD THERAPY FIELDS: CPT | Performed by: RADIOLOGY

## 2020-12-10 PROCEDURE — 77385 HC NTSTY MODUL RAD TX DLVR SMPL: CPT | Performed by: RADIOLOGY

## 2020-12-11 ENCOUNTER — APPOINTMENT (OUTPATIENT)
Dept: RADIATION ONCOLOGY | Age: 75
End: 2020-12-11
Attending: RADIOLOGY
Payer: MEDICARE

## 2020-12-14 ENCOUNTER — HOSPITAL ENCOUNTER (OUTPATIENT)
Dept: RADIATION ONCOLOGY | Age: 75
Discharge: HOME | End: 2020-12-14
Attending: RADIOLOGY
Payer: MEDICARE

## 2020-12-14 PROCEDURE — 77014 PR CT GUIDANCE PLACEMENT RAD THERAPY FIELDS: CPT | Performed by: RADIOLOGY

## 2020-12-14 PROCEDURE — 77427 RADIATION TX MANAGEMENT X5: CPT | Performed by: RADIOLOGY

## 2020-12-14 PROCEDURE — 77385 HC NTSTY MODUL RAD TX DLVR SMPL: CPT | Performed by: RADIOLOGY

## 2020-12-14 PROCEDURE — 77336 RADIATION PHYSICS CONSULT: CPT | Performed by: RADIOLOGY

## 2020-12-15 ENCOUNTER — HOSPITAL ENCOUNTER (OUTPATIENT)
Dept: RADIATION ONCOLOGY | Age: 75
Discharge: HOME | End: 2020-12-15
Attending: RADIOLOGY
Payer: MEDICARE

## 2020-12-15 PROCEDURE — 77385 HC NTSTY MODUL RAD TX DLVR SMPL: CPT | Performed by: RADIOLOGY

## 2020-12-15 PROCEDURE — 77014 PR CT GUIDANCE PLACEMENT RAD THERAPY FIELDS: CPT | Performed by: RADIOLOGY

## 2020-12-16 ENCOUNTER — HOSPITAL ENCOUNTER (OUTPATIENT)
Dept: RADIATION ONCOLOGY | Age: 75
Discharge: HOME | End: 2020-12-16
Attending: RADIOLOGY
Payer: MEDICARE

## 2020-12-16 PROCEDURE — 77385 HC NTSTY MODUL RAD TX DLVR SMPL: CPT | Performed by: RADIOLOGY

## 2020-12-16 PROCEDURE — 77014 PR CT GUIDANCE PLACEMENT RAD THERAPY FIELDS: CPT | Performed by: RADIOLOGY

## 2020-12-17 ENCOUNTER — HOSPITAL ENCOUNTER (OUTPATIENT)
Dept: RADIATION ONCOLOGY | Age: 75
Discharge: HOME | End: 2020-12-17
Attending: RADIOLOGY
Payer: MEDICARE

## 2020-12-17 PROCEDURE — 77385 HC NTSTY MODUL RAD TX DLVR SMPL: CPT | Performed by: RADIOLOGY

## 2020-12-17 PROCEDURE — 77014 PR CT GUIDANCE PLACEMENT RAD THERAPY FIELDS: CPT | Performed by: RADIOLOGY

## 2020-12-18 ENCOUNTER — HOSPITAL ENCOUNTER (OUTPATIENT)
Dept: RADIATION ONCOLOGY | Age: 75
Discharge: HOME | End: 2020-12-18
Attending: RADIOLOGY
Payer: MEDICARE

## 2020-12-18 VITALS — WEIGHT: 186 LBS | BODY MASS INDEX: 27.47 KG/M2

## 2020-12-18 PROCEDURE — 77014 PR CT GUIDANCE PLACEMENT RAD THERAPY FIELDS: CPT | Performed by: RADIOLOGY

## 2020-12-18 PROCEDURE — 77336 RADIATION PHYSICS CONSULT: CPT | Performed by: RADIOLOGY

## 2020-12-18 PROCEDURE — 77385 HC NTSTY MODUL RAD TX DLVR SMPL: CPT | Performed by: RADIOLOGY

## 2020-12-18 PROCEDURE — 77427 RADIATION TX MANAGEMENT X5: CPT | Performed by: RADIOLOGY

## 2020-12-18 ASSESSMENT — PAIN SCALES - GENERAL: PAINLEVEL_OUTOF10: 0

## 2020-12-18 NOTE — PROGRESS NOTES
Weekly Radiation Treatment Progress Note    DATE OF SERVICE: 12/18/2020     DIAGNOSIS:  Cancer Staging  Adenocarcinoma of prostate [facility])  Staging form: Prostate, AJCC 8th Edition  - Clinical: Stage IIB (cT1c, cN0, cM0, PSA: 5.6, Grade Group: 2) - Unsigned       TREATMENT COURSE:   Oncology History   Adenocarcinoma of prostate ([facility] [address]   3/18/2019 Initial Diagnosis    Adenocarcinoma of prostate (HCC)    TRUS Bx  [place] 3+3=6 adenocarcinoma, 5/12 cores, 6% submitted tissue     8/20/2020 Biopsy    TRUS Bx  Single core with [place] 3+4=7 adenocarcinoma at right lateral base  Remainder showing [place] 3+3=6  6/15 cores, 8% submitted tissue       10/15/2020 -  Radiation    Prostate/SV  Dual Arc VMAT-IMRT using 6MV photons  180 cGy x 30 = 5400 cGy    Prostate [place]  Dual arc VMAT-IMRT using 6MV photons  180 cGy x 14 = 2520 cGy (7920 cGy cumulative dose)           Site: Prostate  Current Total Radiation Dose: 7920 cGy    Pt doing well. Energy good. Mild dysuria/ frequency.  Still Up 2x/pm  No diarrhea    EXAM  Wt Readings from Last 3 Encounters:   12/07/20 185 lb 12.8 oz (84.3 kg)   11/23/20 186 lb 12.8 oz (84.7 kg)   11/16/20 185 lb (83.9 kg)     NAD    Setup images, chart, plan reviewed    A/P:   Tolerating RT well  Return visit 1 month      Electronically signed by Bryan Gallegos MD on 12/18/2020 at 11:27 AM

## 2020-12-21 NOTE — PROGRESS NOTES
Radiation Oncology  Treatment Completion Summary  Encounter Date: 2020 8:47 AM    Mr. Edenilson Howard is a 70 y.o. male  : 1945  MRN: [id number]  [facility]      FOLLOW UP PHYSICIANS:   Primary Care: Junie Penaloza MD       DIAGNOSIS:    Cancer Staging  Adenocarcinoma of prostate [facility])  Staging form: Prostate, AJCC 8th Edition  - Clinical: Stage IIB (cT1c, cN0, cM0, PSA: 5.6, Grade Group: 2) - Unsigned         TREATMENT COURSE:   Oncology History   Adenocarcinoma of prostate ([facility] [address]   3/18/2019 Initial Diagnosis    Adenocarcinoma of prostate ([facility] [address]    TRUS Bx  [place] 3+3=6 adenocarcinoma, 5/12 cores, 6% submitted tissue     2020 Biopsy    TRUS Bx  Single core with [place] 3+4=7 adenocarcinoma at right lateral base  Remainder showing [place] 3+3=6  6/15 cores, 8% submitted tissue       10/15/2020 - 2020 Radiation    Prostate/SV  Dual Arc VMAT-IMRT using 6MV photons  180 cGy x 30 = 5400 cGy    Prostate [place]  Dual arc VMAT-IMRT using 6MV photons  180 cGy x 14 = 2520 cGy (7920 cGy cumulative dose)           HISTORY:  Edenilson Howard is a 70 y.o. male with the above referenced diagnosis. For complete details on history of present illness please see my initial consultation note. A course of definitive radiation therapy was recently completed with details provided above:       TREATMENT TOLERANCE:   Tolerated therapy well with minimal acute effects.   He had mild increase in urinary frequency and nocturia which did not require any special intervention      FOLLOW-UP PLANS:   RTC 1 month for f/u and symptom check  PSA 1-3 months      Electronically signed by Electronically signed by Kwame Saldana MD on 2020 at 8:47 AM

## 2021-01-07 ENCOUNTER — TELEPHONE (OUTPATIENT)
Dept: RADIATION ONCOLOGY | Age: 76
End: 2021-01-07

## 2021-03-31 ENCOUNTER — HOSPITAL ENCOUNTER (OUTPATIENT)
Dept: RADIATION ONCOLOGY | Age: 76
Discharge: HOME | End: 2021-03-31
Attending: RADIOLOGY
Payer: MEDICARE

## 2021-03-31 VITALS
DIASTOLIC BLOOD PRESSURE: 79 MMHG | RESPIRATION RATE: 16 BRPM | WEIGHT: 189.4 LBS | SYSTOLIC BLOOD PRESSURE: 166 MMHG | TEMPERATURE: 96.8 F | HEART RATE: 62 BPM | HEIGHT: 69 IN | OXYGEN SATURATION: 96 % | BODY MASS INDEX: 28.05 KG/M2

## 2021-03-31 DIAGNOSIS — C61 ADENOCARCINOMA OF PROSTATE (HCC): Primary | ICD-10-CM

## 2021-03-31 PROCEDURE — 99211 OFF/OP EST MAY X REQ PHY/QHP: CPT

## 2021-03-31 PROCEDURE — 99213 OFFICE O/P EST LOW 20 MIN: CPT | Performed by: RADIOLOGY

## 2021-03-31 NOTE — PROGRESS NOTES
[id number] [facility]  [address] [facility], [address] [facility] [address]  Phone: [phone]  Fax: [phone]    RADIATION ONCOLOGY FOLLOW UP REPORT    PATIENT NAME:  Fredrick Potts              : 1945  MEDICAL RECORD NO: [id number]    [facility] NO: [id number]        PROVIDER: Brenda Mcleod MD      DATE OF SERVICE: 3/31/2021     FOLLOW UP PHYSICIANS:   Primary Care: Myesha Renteria MD       DIAGNOSIS:   Cancer Staging  Adenocarcinoma of prostate [facility])  Staging form: Prostate, AJCC 8th Edition  - Clinical: Stage IIB (cT1c, cN0, cM0, PSA: 5.6, Grade Group: 2) - Unsigned         TREATMENT COURSE:   Oncology History Overview Note   PSA History:  2019  4.8  2020  5.6  2021  1.1 (3-months post-RT)     Adenocarcinoma of prostate ([facility] [address]   3/18/2019 Initial Diagnosis    Adenocarcinoma of prostate ([address]    TRUS Bx  [place] 3+3=6 adenocarcinoma, 5/12 cores, 6% submitted tissue     2020 Biopsy    TRUS Bx  Single core with [place] 3+4=7 adenocarcinoma at right lateral base  Remainder showing [place] 3+3=6  6/15 cores, 8% submitted tissue       10/15/2020 - 2020 Radiation    Prostate/SV  Dual Arc VMAT-IMRT using 6MV photons  180 cGy x 30 = 5400 cGy    Prostate [place]  Dual arc VMAT-IMRT using 6MV photons  180 cGy x 14 = 2520 cGy (7920 cGy cumulative dose)         CURRENT THERAPY:  PSA Surveillance      INTERVAL HISTORY:   Doing well at 3 months post-RT  No new complaints or LUTS  Specifically denies urgency, frequency, hematuria, dysuria, hesitancy, nocturia  No rectal bleeding, diarrhea, or other changes in bowels  Denies pelvic pain or new bone pains.   PSA done at outside lab and reviewed with results above      Past Medical History:   Diagnosis Date    Cancer ([address]     basal cell    History of external beam radiation therapy     Prostate 5,400 cGy per  at [facility]         Past Surgical History:   Procedure Laterality Date    PROSTATE BIOPSY  03/18/2019    PROSTATE BIOPSY  08/20/2020    SKIN BIOPSY         History reviewed. No pertinent family history. Social History     Socioeconomic History    Marital status:      Spouse name: Not on file    Number of children: Not on file    Years of education: Not on file    Highest education level: Not on file   Occupational History    Not on file   Social Needs    Financial resource strain: Not on file    Food insecurity     Worry: Not on file     Inability: Not on file    Transportation needs     Medical: Not on file     Non-medical: Not on file   Tobacco Use    Smoking status: Never Smoker    Smokeless tobacco: Never Used   Substance and Sexual Activity    Alcohol use: Yes    Drug use: No    Sexual activity: Not on file   Lifestyle    Physical activity     Days per week: Not on file     Minutes per session: Not on file    Stress: Not on file   Relationships    Social connections     Talks on phone: Not on file     Gets together: Not on file     Attends [employer] service: Not on file     Active member of club or organization: Not on file     Attends meetings of clubs or organizations: Not on file     Relationship status: Not on file    Intimate partner violence     Fear of current or ex partner: Not on file     Emotionally abused: Not on file     Physically abused: Not on file     Forced sexual activity: Not on file   Other Topics Concern    Not on file   Social History Narrative    Not on file         MEDICATIONS:     Current Outpatient Medications:     Cholecalciferol (VITAMIN D3) 1.25 MG (54352 UT) TABS, Take 1 tablet by mouth daily, Disp: , Rfl:     simvastatin (ZOCOR) 80 MG tablet, Take 80 mg by mouth nightly., Disp: , Rfl:     aspirin 325 MG tablet, Take 325 mg by mouth daily. , Disp: , Rfl:     Omega-3 Fatty Acids (FISH OIL) 1000 MG CAPS, Take 3,000 mg by mouth daily. , Disp: , Rfl:       REVIEW OF SYSTEMS:  Pertinent positive and negatives in History: the remainder of the 14-pt ROS is negative. EXAMINATION:   Vitals:    03/31/21 0808   BP: (!) 166/79   Pulse: 62   Resp: 16   Temp: 96.8 °F (36 °C)   SpO2: 96%     A&O x3, NAD  Sclera anicteric, EOMI  No UE/LE edema  Nl mood/affect/judgement  Rectal: Deferred based on decreasing PSA      LABORATORY STUDIES:   PSA Hx as above      RADIOLOGIC STUDIES:  No results found. MEDICAL DECISION MAKING:   Doing well without clinical or biochemical evidence of recurrence  RTC 6 months with repeat PSA      MEDICAL DECISION MAKING    Number/Complexity of Problems Addressed  [] 1 self-limited of minor problem (81624/97444)  [x] >=2 self-limited or minor problems, 1 stable chronic illness, 1 acute/uncomplicated illness/injury (03761/61580)  []Chronic illnesses with exacerbation/progression/side effects of treatment, >=2 stable chronic illnesses, 1 undiagnosed new problem with uncertain prognosis, 1 acute illness with systemic symptoms, 1 acute complicated injury (14696/60194)  []Chronic illnesses with severe exacerbation/progression/treatment side effects, acute or chronic illness or injury that poses a threat to life or bodily function (81162/31893)    Amount and/or Complexity of Data Reviewed  [] Minimal or none (49484/46829)  [x] Limited - meets 1/2 categories (70666/41225)  1. At least 2 of: review of prior external notes from each unique source, review results of each unique test, ordering of each unique test  2. Assessment requiring an independent historian  [] Moderate - meets 1/3 categories (50150/63451)  1. Any 3 of: Review of prior external notes from each unique source, review results of each unique test, ordering of each unique test, assessment requiring an independent historian  2. Independent interpretation of tests  3. Discussion of management or test interpretation  [] Extensive - meets 2/3 categories (57758/46571)  1.  Any 3 of: Review of prior external notes from each unique source, review results of each unique test, ordering of each unique test, assessment requiring an independent historian  2. Independent interpretation of tests  3.  Discussion of management or test interpretation    Risk of complications and/or morbidity/mortality of patient management  [] Minimal (65705/88905)  [x] Low (97871/87313)  [] Moderate (75509/17952)  Examples: Rx drug management, decision regarding minor surgery with identified patient or procedure risk factors, decision regarding elective major surgery without identified patient or procedure risk factors, diagnosis or treatment significantly limited by social determinants of health  [] High (79932/29262)  Examples: drug therapy requiring intensive monitoring for toxicity, decision regarding elective major surgery with identified patient/procedure risk factors, decision regarding emergency major surgery, decision regarding hospitalization, decision for DNR or de-escalation of care because of poor prognosis    LEVEL OF MDM (based on 2/3 above categories)  [] Straightforward (16537/51586)  [x] Low (18522/96231)  [] Moderate (41262/14984)  [] High (31512/17644)  Electronically signed by E    lectronically signed by Kayli Vilchis MD on 3/31/2021 at 8:41 AM

## 2021-03-31 NOTE — PROGRESS NOTES
Leonela Best  3/31/2021    Patient is seen today for follow up. Vitals:    03/31/21 0808   BP: (!) 166/79   Pulse: 62   Resp: 16   Temp: 96.8 °F (36 °C)   SpO2: 96%        Oxygen Therapy  SpO2: 96 %  Pulse Oximeter Device Mode: Continuous  Pulse Oximeter Device Location: Finger  O2 Device: None (Room air)  Skin Assessment: Clean, dry, & intact    Wt Readings from Last 3 Encounters:   03/31/21 189 lb 6.4 oz (85.9 kg)   12/18/20 186 lb (84.4 kg)   12/07/20 185 lb 12.8 oz (84.3 kg)       Pain Assessment  Pain Assessment: 0-10  Pain Level: 0  Patient's Stated Pain Goal: No pain  Multiple Pain Sites: No  Denies Need for Intervention     No Known Allergies     Current Outpatient Medications   Medication Sig Dispense Refill    Cholecalciferol (VITAMIN D3) 1.25 MG (32186 UT) TABS Take 1 tablet by mouth daily      simvastatin (ZOCOR) 80 MG tablet Take 80 mg by mouth nightly.  aspirin 325 MG tablet Take 325 mg by mouth daily.  Omega-3 Fatty Acids (FISH OIL) 1000 MG CAPS Take 3,000 mg by mouth daily. No current facility-administered medications for this encounter. Additional Comments: Pt here for follow up, states no questions or concerns.     Electronically signed by Owen Kennedy MA on 3/31/2021 at 8:10 AM

## 2021-09-30 ENCOUNTER — HOSPITAL ENCOUNTER (OUTPATIENT)
Dept: RADIATION ONCOLOGY | Age: 76
End: 2021-09-30
Attending: RADIOLOGY
Payer: MEDICARE

## 2021-10-19 ENCOUNTER — TELEPHONE (OUTPATIENT)
Dept: INFUSION THERAPY | Age: 76
End: 2021-10-19

## 2021-10-19 NOTE — TELEPHONE ENCOUNTER
[facility] & TOLD HIM TO DISREGARD PREVIOUS MESSAGE. TOLD HIM TO KEEP APPT FOR OV.   PATIENT NEEDS TO RESCHEDULE  APPT FOR OV

## 2021-10-20 ENCOUNTER — HOSPITAL ENCOUNTER (OUTPATIENT)
Dept: RADIATION ONCOLOGY | Age: 76
Discharge: HOME | End: 2021-10-20
Attending: RADIOLOGY
Payer: MEDICARE

## 2021-10-20 ENCOUNTER — TELEPHONE (OUTPATIENT)
Dept: INFUSION THERAPY | Age: 76
End: 2021-10-20

## 2021-10-20 VITALS
HEIGHT: 69 IN | OXYGEN SATURATION: 98 % | HEART RATE: 61 BPM | SYSTOLIC BLOOD PRESSURE: 156 MMHG | DIASTOLIC BLOOD PRESSURE: 70 MMHG | WEIGHT: 195 LBS | BODY MASS INDEX: 28.88 KG/M2 | RESPIRATION RATE: 16 BRPM | TEMPERATURE: 97 F

## 2021-10-20 DIAGNOSIS — C61 ADENOCARCINOMA OF PROSTATE (HCC): Primary | ICD-10-CM

## 2021-10-20 PROCEDURE — 99211 OFF/OP EST MAY X REQ PHY/QHP: CPT

## 2021-10-20 PROCEDURE — 99213 OFFICE O/P EST LOW 20 MIN: CPT | Performed by: RADIOLOGY

## 2021-10-20 RX ORDER — ROSUVASTATIN CALCIUM 40 MG/1
1 TABLET, COATED ORAL DAILY
COMMUNITY
Start: 2021-08-12

## 2021-10-20 ASSESSMENT — PAIN SCALES - GENERAL: PAINLEVEL_OUTOF10: 0

## 2021-10-20 NOTE — PROGRESS NOTES
[address] [facility]  [address] [facility], [address] [facility]  Phone: [phone]  Fax: [phone]    RADIATION ONCOLOGY FOLLOW UP REPORT    PATIENT NAME:  Keshav Nettles              : 1945  MEDICAL RECORD NO: [id number]    [facility] NO: [id number]        PROVIDER: Mulugeta Flores MD      DATE OF SERVICE: 10/20/2021     FOLLOW UP PHYSICIANS:   Primary Care: Nisha Ramos MD       DIAGNOSIS:   Cancer Staging  Adenocarcinoma of prostate [facility])  Staging form: Prostate, AJCC 8th Edition  - Clinical: Stage IIB (cT1c, cN0, cM0, PSA: 5.6, Grade Group: 2) - Unsigned         TREATMENT COURSE:   Oncology History Overview Note   PSA History:  2019  4.8  2020  5.6  2021  1.1 (3-months post-RT)  10/13/2021  0.3     Adenocarcinoma of prostate ([facility] [address]   3/18/2019 Initial Diagnosis    Adenocarcinoma of prostate ([facility] [address]    TRUS Bx  [place] 3+3=6 adenocarcinoma, 5/12 cores, 6% submitted tissue     2020 Biopsy    TRUS Bx  Single core with [place] 3+4=7 adenocarcinoma at right lateral base  Remainder showing [place] 3+3=6  6/15 cores, 8% submitted tissue       10/15/2020 - 2020 Radiation    Prostate/SV  Dual Arc VMAT-IMRT using 6MV photons  180 cGy x 30 = 5400 cGy    Prostate [place]  Dual arc VMAT-IMRT using 6MV photons  180 cGy x 14 = 2520 cGy (7920 cGy cumulative dose)         CURRENT THERAPY:  PSA Surveillance      INTERVAL HISTORY:   Doing well at 6 month interval f/u  No new complaints or LUTS  Specifically denies urgency, frequency, hematuria, dysuria, hesitancy, nocturia  No rectal bleeding, diarrhea, or other changes in bowels  Denies pelvic pain or new bone pains.   PSA done at outside lab and reviewed with results above      Past Medical History:   Diagnosis Date    Cancer ([address]     basal cell    History of external beam radiation therapy     Prostate 5,400 cGy per  [facility]         Past Surgical History: Procedure Laterality Date    PROSTATE BIOPSY  03/18/2019    PROSTATE BIOPSY  08/20/2020    SKIN BIOPSY         History reviewed. No pertinent family history. Social History     Socioeconomic History    Marital status:      Spouse name: Not on file    Number of children: Not on file    Years of education: Not on file    Highest education level: Not on file   Occupational History    Not on file   Tobacco Use    Smoking status: Never Smoker    Smokeless tobacco: Never Used   Substance and Sexual Activity    Alcohol use: Yes    Drug use: No    Sexual activity: Not on file   Other Topics Concern    Not on file   Social History Narrative    Not on file     Social Determinants of Health     Financial Resource Strain:     Difficulty of Paying Living Expenses:    Food Insecurity:     Worried About Running Out of Food in the Last Year:     [address] in the Last Year:    Transportation Needs:     Lack of Transportation (Medical):  Lack of Transportation (Non-Medical):    Physical Activity:     Days of Exercise per Week:     Minutes of Exercise per Session:    Stress:     Feeling of Stress :    Social Connections:     Frequency of Communication with Friends and Family:     Frequency of Social Gatherings with Friends and Family:     Attends Shinto Services:     Active Member of Clubs or Organizations:     Attends Club or Organization Meetings:     Marital Status:    Intimate Partner Violence:     Fear of Current or Ex-Partner:     Emotionally Abused:     Physically Abused:     Sexually Abused:          MEDICATIONS:     Current Outpatient Medications:     rosuvastatin (CRESTOR) 40 MG tablet, Take 1 tablet by mouth daily, Disp: , Rfl:     Cholecalciferol (VITAMIN D3) 1.25 MG (95088 UT) TABS, Take 1 tablet by mouth daily, Disp: , Rfl:     aspirin 325 MG tablet, Take 325 mg by mouth daily. , Disp: , Rfl:     Omega-3 Fatty Acids (FISH OIL) 1000 MG CAPS, Take 3,000 mg by mouth daily. , Disp: , Rfl:       REVIEW OF SYSTEMS:  Pertinent positive and negatives in History: the remainder of the 14-pt ROS is negative. EXAMINATION:   Vitals:    10/20/21 0906   BP: (!) 156/70   Pulse: 61   Resp: 16   Temp: 97 °F (36.1 °C)   SpO2: 98%     A&O x3, NAD  Sclera anicteric, EOMI  No UE/LE edema  Nl mood/affect/judgement  Rectal: Deferred based on decreasing PSA      LABORATORY STUDIES:   PSA Hx as above      RADIOLOGIC STUDIES:  No results found. MEDICAL DECISION MAKING:   Doing well without clinical or biochemical evidence of recurrence  RTC 6 months with repeat PSA      MEDICAL DECISION MAKING    Number/Complexity of Problems Addressed  [] 1 self-limited of minor problem (36561/51175)  [x] >=2 self-limited or minor problems, 1 stable chronic illness, 1 acute/uncomplicated illness/injury (29096/58328)  []Chronic illnesses with exacerbation/progression/side effects of treatment, >=2 stable chronic illnesses, 1 undiagnosed new problem with uncertain prognosis, 1 acute illness with systemic symptoms, 1 acute complicated injury (84233/78607)  []Chronic illnesses with severe exacerbation/progression/treatment side effects, acute or chronic illness or injury that poses a threat to life or bodily function (14473/56770)    Amount and/or Complexity of Data Reviewed  [] Minimal or none (41905/69257)  [x] Limited - meets 1/2 categories (37941/31243)  1. At least 2 of: review of prior external notes from each unique source, review results of each unique test, ordering of each unique test  2. Assessment requiring an independent historian  [] Moderate - meets 1/3 categories (53594/06826)  1. Any 3 of: Review of prior external notes from each unique source, review results of each unique test, ordering of each unique test, assessment requiring an independent historian  2. Independent interpretation of tests  3.  Discussion of management or test interpretation  [] Extensive - meets 2/3 categories (45629/82156)  1. Any 3 of: Review of prior external notes from each unique source, review results of each unique test, ordering of each unique test, assessment requiring an independent historian  2. Independent interpretation of tests  3.  Discussion of management or test interpretation    Risk of complications and/or morbidity/mortality of patient management  [] Minimal (64429/35087)  [x] Low (90877/12715)  [] Moderate (61082/42596)  Examples: Rx drug management, decision regarding minor surgery with identified patient or procedure risk factors, decision regarding elective major surgery without identified patient or procedure risk factors, diagnosis or treatment significantly limited by social determinants of health  [] High (54369/46585)  Examples: drug therapy requiring intensive monitoring for toxicity, decision regarding elective major surgery with identified patient/procedure risk factors, decision regarding emergency major surgery, decision regarding hospitalization, decision for DNR or de-escalation of care because of poor prognosis    LEVEL OF MDM (based on 2/3 above categories)  [] Straightforward (75714/01838)  [x] Low (74651/08957)  [] Moderate (26053/17552)  [] High (08729/54978)         Electronically signed by Amaya Rhoades MD on 10/20/2021 at 9:23 AM

## 2021-10-20 NOTE — PROGRESS NOTES
Norman Montano  10/20/2021    Patient is seen today for follow up. Vitals:    10/20/21 0906   BP: (!) 156/70   Pulse: 61   Resp: 16   Temp: 97 °F (36.1 °C)   SpO2: 98%        Oxygen Therapy  SpO2: 98 %  Pulse Oximeter Device Mode: Intermittent  Pulse Oximeter Device Location: Right, Finger  O2 Device: None (Room air)  Skin Assessment: Clean, dry, & intact    Wt Readings from Last 3 Encounters:   10/20/21 195 lb (88.5 kg)   03/31/21 189 lb 6.4 oz (85.9 kg)   12/18/20 186 lb (84.4 kg)       Pain Assessment  Pain Assessment: 0-10  Pain Level: 0  Patient's Stated Pain Goal: No pain  Multiple Pain Sites: No  Denies Need for Intervention     No Known Allergies     Current Outpatient Medications   Medication Sig Dispense Refill    rosuvastatin (CRESTOR) 40 MG tablet Take 1 tablet by mouth daily      Cholecalciferol (VITAMIN D3) 1.25 MG (06128 UT) TABS Take 1 tablet by mouth daily      aspirin 325 MG tablet Take 325 mg by mouth daily.  Omega-3 Fatty Acids (FISH OIL) 1000 MG CAPS Take 3,000 mg by mouth daily. No current facility-administered medications for this encounter. Additional Comments: Pt here for routine follow up with Dr. Chanel Bond. Denies any new urinary issues, no new concerns voiced. Spouse at chairside.     Electronically signed by Apryl Griffin RN on 10/20/2021 at 9:08 AM

## 2022-04-20 ENCOUNTER — HOSPITAL ENCOUNTER (OUTPATIENT)
Dept: RADIATION ONCOLOGY | Age: 77
Discharge: HOME | End: 2022-04-20
Attending: RADIOLOGY
Payer: MEDICARE

## 2022-04-20 VITALS
RESPIRATION RATE: 16 BRPM | OXYGEN SATURATION: 96 % | HEART RATE: 71 BPM | WEIGHT: 200 LBS | HEIGHT: 69 IN | TEMPERATURE: 96.9 F | BODY MASS INDEX: 29.62 KG/M2

## 2022-04-20 DIAGNOSIS — C61 ADENOCARCINOMA OF PROSTATE (HCC): Primary | ICD-10-CM

## 2022-04-20 ASSESSMENT — PAIN SCALES - GENERAL: PAINLEVEL_OUTOF10: 0

## 2022-04-20 NOTE — PROGRESS NOTES
[address] [facility]  [address] [facility], [address]  Phone: [phone]  Fax: [phone]    RADIATION ONCOLOGY FOLLOW UP REPORT    PATIENT NAME:  Jihan Zazueta              : 1945  MEDICAL RECORD NO: [id number]    [facility] NO: [id number]        PROVIDER: Tanvir Rios MD      DATE OF SERVICE: 2022     FOLLOW UP PHYSICIANS:   Primary Care: Abbe Patiño MD       DIAGNOSIS:   Cancer Staging  Adenocarcinoma of prostate [facility])  Staging form: Prostate, AJCC 8th Edition  - Clinical: Stage IIB (cT1c, cN0, cM0, PSA: 5.6, Grade Group: 2) - Unsigned         TREATMENT COURSE:   Oncology History Overview Note   PSA History:  2019  4.8  2020  5.6  2021  1.1 (3-months post-RT)  10/13/2021  0.3  2022  0.2     Adenocarcinoma of prostate ([facility] [address] [address]   3/18/2019 Initial Diagnosis    Adenocarcinoma of prostate ([address]    TRUS Bx  [place] 3+3=6 adenocarcinoma, 5/12 cores, 6% submitted tissue     2020 Biopsy    TRUS Bx  Single core with [place] 3+4=7 adenocarcinoma at right lateral base  Remainder showing [place] 3+3=6  6/15 cores, 8% submitted tissue       10/15/2020 - 2020 Radiation    Prostate/SV  Dual Arc VMAT-IMRT using 6MV photons  180 cGy x 30 = 5400 cGy    Prostate [place]  Dual arc VMAT-IMRT using 6MV photons  180 cGy x 14 = 2520 cGy (7920 cGy cumulative dose)         CURRENT THERAPY:  PSA Surveillance      INTERVAL HISTORY:   Doing well at 6 month interval f/u  No new complaints or LUTS  Specifically denies urgency, frequency, hematuria, dysuria, hesitancy, nocturia  No rectal bleeding, diarrhea, or other changes in bowels  Denies pelvic pain or new bone pains.   PSA done at outside lab and reviewed with results above      Past Medical History:   Diagnosis Date    Cancer ([address]     basal cell    History of external beam radiation therapy     Prostate 5,400 cGy per  at [facility]         Past Surgical History:   Procedure Laterality Date    PROSTATE BIOPSY  03/18/2019    PROSTATE BIOPSY  08/20/2020    SKIN BIOPSY         History reviewed. No pertinent family history. Social History     Socioeconomic History    Marital status:      Spouse name: Not on file    Number of children: Not on file    Years of education: Not on file    Highest education level: Not on file   Occupational History    Not on file   Tobacco Use    Smoking status: Never Smoker    Smokeless tobacco: Never Used   Substance and Sexual Activity    Alcohol use: Yes    Drug use: No    Sexual activity: Not on file   Other Topics Concern    Not on file   Social History Narrative    Not on file     Social Determinants of Health     Financial Resource Strain:     Difficulty of Paying Living Expenses: Not on file   Food Insecurity:     Worried About Running Out of Food in the Last Year: Not on file    [place] of Food in the Last Year: Not on file   Transportation Needs:     Lack of Transportation (Medical): Not on file    Lack of Transportation (Non-Medical):  Not on file   Physical Activity:     Days of Exercise per Week: Not on file    Minutes of Exercise per Session: Not on file   Stress:     Feeling of Stress : Not on file   Social Connections:     Frequency of Communication with Friends and Family: Not on file    Frequency of Social Gatherings with Friends and Family: Not on file    Attends Yarsanism Services: Not on file    Active Member of Clubs or Organizations: Not on file    Attends Club or Organization Meetings: Not on file    Marital Status: Not on file   Intimate Partner Violence:     Fear of Current or Ex-Partner: Not on file    Emotionally Abused: Not on file    Physically Abused: Not on file    Sexually Abused: Not on file   Housing Stability:     Unable to Pay for Housing in the Last Year: Not on file    Number of [place] in the Last Year: Not on file    Unstable Housing in the Last Year: Not on file         MEDICATIONS:     Current Outpatient Medications:     rosuvastatin (CRESTOR) 40 MG tablet, Take 1 tablet by mouth daily, Disp: , Rfl:     Cholecalciferol (VITAMIN D3) 1.25 MG (00256 UT) TABS, Take 1 tablet by mouth daily, Disp: , Rfl:     aspirin 325 MG tablet, Take 325 mg by mouth daily. , Disp: , Rfl:     Omega-3 Fatty Acids (FISH OIL) 1000 MG CAPS, Take 3,000 mg by mouth daily. , Disp: , Rfl:       REVIEW OF SYSTEMS:  Pertinent positive and negatives in History: the remainder of the 14-pt ROS is negative. EXAMINATION:   Vitals:    04/20/22 0933   Pulse: 71   Resp: 16   Temp: 96.9 °F (36.1 °C)   SpO2: 96%     A&O x3, NAD  Sclera anicteric, EOMI  No UE/LE edema  Nl mood/affect/judgement  Rectal: Deferred based on decreasing PSA      LABORATORY STUDIES:   PSA Hx as above      RADIOLOGIC STUDIES:  No results found. MEDICAL DECISION MAKING:   Doing well without clinical or biochemical evidence of recurrence  No intervention needed  RTC 6 months with repeat PSA      MEDICAL DECISION MAKING    Number/Complexity of Problems Addressed  [] 1 self-limited of minor problem (81177/85727)  [x] >=2 self-limited or minor problems, 1 stable chronic illness, 1 acute/uncomplicated illness/injury (20400/75467)  []Chronic illnesses with exacerbation/progression/side effects of treatment, >=2 stable chronic illnesses, 1 undiagnosed new problem with uncertain prognosis, 1 acute illness with systemic symptoms, 1 acute complicated injury (96694/79562)  []Chronic illnesses with severe exacerbation/progression/treatment side effects, acute or chronic illness or injury that poses a threat to life or bodily function (05653/95304)    Amount and/or Complexity of Data Reviewed  [] Minimal or none (40105/44185)  [x] Limited - meets 1/2 categories (73432/54070)  1.  At least 2 of: review of prior external notes from each unique source, review results of each unique test, ordering of each unique test  2. Assessment requiring an independent historian  [] Moderate - meets 1/3 categories (00947/81580)  1. Any 3 of: Review of prior external notes from each unique source, review results of each unique test, ordering of each unique test, assessment requiring an independent historian  2. Independent interpretation of tests  3. Discussion of management or test interpretation  [] Extensive - meets 2/3 categories (90402/05528)  1. Any 3 of: Review of prior external notes from each unique source, review results of each unique test, ordering of each unique test, assessment requiring an independent historian  2. Independent interpretation of tests  3.  Discussion of management or test interpretation    Risk of complications and/or morbidity/mortality of patient management  [] Minimal (92218/12765)  [x] Low (60843/48794)  [] Moderate (00877/26032)  Examples: Rx drug management, decision regarding minor surgery with identified patient or procedure risk factors, decision regarding elective major surgery without identified patient or procedure risk factors, diagnosis or treatment significantly limited by social determinants of health  [] High (37388/95373)  Examples: drug therapy requiring intensive monitoring for toxicity, decision regarding elective major surgery with identified patient/procedure risk factors, decision regarding emergency major surgery, decision regarding hospitalization, decision for DNR or de-escalation of care because of poor prognosis    LEVEL OF MDM (based on 2/3 above categories)  [] Straightforward (23165/22537)  [x] Low (96565/66022)  [] Moderate (90099/53953)  [] High (67615/17910)         Electronically signed by Mamta Gustafson MD on 4/20/2022 at 10:17 AM

## 2022-04-20 NOTE — PROGRESS NOTES
Son Rosanna  4/20/2022    Patient is seen today for follow up. Vitals:    04/20/22 0933   Pulse: 71   Resp: 16   Temp: 96.9 °F (36.1 °C)   SpO2: 96%        Oxygen Therapy  SpO2: 96 %  Pulse Oximeter Device Mode: Continuous  Pulse Oximeter Device Location: Finger  O2 Device: None (Room air)  Skin Assessment: Clean, dry, & intact    Wt Readings from Last 3 Encounters:   04/20/22 200 lb (90.7 kg)   10/20/21 195 lb (88.5 kg)   03/31/21 189 lb 6.4 oz (85.9 kg)       Pain Assessment  Pain Assessment: None - Denies Pain  Pain Level: 0  Multiple Pain Sites: No  Denies Need for Intervention     No Known Allergies     Current Outpatient Medications   Medication Sig Dispense Refill    rosuvastatin (CRESTOR) 40 MG tablet Take 1 tablet by mouth daily      Cholecalciferol (VITAMIN D3) 1.25 MG (44041 UT) TABS Take 1 tablet by mouth daily      aspirin 325 MG tablet Take 325 mg by mouth daily.  Omega-3 Fatty Acids (FISH OIL) 1000 MG CAPS Take 3,000 mg by mouth daily. No current facility-administered medications for this encounter. Additional Comments: Pt here for follow up with AD, states no new issues or concerns.      Electronically signed by Nazario Parker CMA on 4/20/2022 at 9:37 AM

## 2022-10-19 ENCOUNTER — APPOINTMENT (OUTPATIENT)
Dept: RADIATION ONCOLOGY | Age: 77
End: 2022-10-19
Attending: RADIOLOGY
Payer: MEDICARE

## 2022-10-21 ENCOUNTER — HOSPITAL ENCOUNTER (OUTPATIENT)
Dept: RADIATION ONCOLOGY | Age: 77
Discharge: HOME | End: 2022-10-21
Attending: RADIOLOGY
Payer: MEDICARE

## 2022-10-21 VITALS
HEART RATE: 78 BPM | WEIGHT: 196 LBS | BODY MASS INDEX: 29.03 KG/M2 | HEIGHT: 69 IN | OXYGEN SATURATION: 96 % | SYSTOLIC BLOOD PRESSURE: 160 MMHG | TEMPERATURE: 98.2 F | RESPIRATION RATE: 16 BRPM | DIASTOLIC BLOOD PRESSURE: 90 MMHG

## 2022-10-21 DIAGNOSIS — C61 ADENOCARCINOMA OF PROSTATE (HCC): Primary | ICD-10-CM

## 2022-10-21 PROCEDURE — 99213 OFFICE O/P EST LOW 20 MIN: CPT | Performed by: RADIOLOGY

## 2022-10-21 PROCEDURE — 99211 OFF/OP EST MAY X REQ PHY/QHP: CPT

## 2022-10-21 NOTE — PROGRESS NOTES
[address] [address] [id number] [facility], [address] [facility]  Phone: [phone]  Fax: [phone]    RADIATION ONCOLOGY FOLLOW UP REPORT    PATIENT NAME:  Shakeel Sweeney              : 1945  MEDICAL RECORD NO: [id number]    [facility] NO: [id number]        PROVIDER: Toni Thrasher MD      DATE OF SERVICE: 10/21/2022     DIAGNOSIS: Cancer Staging  Adenocarcinoma of prostate [facility])  Staging form: Prostate, AJCC 8th Edition  - Clinical: Stage IIB (cT1c, cN0, cM0, PSA: 5.6, Grade Group: 2) - Unsigned       TREATMENT COURSE:   Oncology History Overview Note   PSA History:  2019  4.8  2020  5.6  2021  1.1 (3-months post-RT)  10/13/2021  0.3  2022  0.2     Adenocarcinoma of prostate ([address]   3/18/2019 Initial Diagnosis    Adenocarcinoma of prostate ([facility])    TRUS Bx  [place] 3+3=6 adenocarcinoma, 5/12 cores, 6% submitted tissue     2020 Biopsy    TRUS Bx  Single core with [place] 3+4=7 adenocarcinoma at right lateral base  Remainder showing [place] 3+3=6  6/15 cores, 8% submitted tissue       10/15/2020 - 2020 Radiation    Prostate/SV  Dual Arc VMAT-IMRT using 6MV photons  180 cGy x 30 = 5400 cGy    Prostate [place]  Dual arc VMAT-IMRT using 6MV photons  180 cGy x 14 = 2520 cGy (7920 cGy cumulative dose)       Favorable intermediate risk prostate cancer sp definitive radiation therapy completed 2020    HPI:   Shakeel Sweeney is a 78 y.o. male who has a history as above who returns today for routine follow-up visit. He was last seen by  22. PSA at that time was 0.2    His most recent PSA was obtained on 10/14/22 and was 0.1. Currently, the patient reports he's been doing well. His energy level has been fairly good overall. He denies any fevers, chills, or drenching night sweats. His weight and appetite have been stable. He denies any chest pain or shortness of breath.   He has not noticed any new lumps or bumps anywhere throughout his body. He denies the new onset of bony pain. He has not been experiencing any pain within the abdomen or pelvis. He denies any dysuria, hematuria, diarrhea, melena, or hematochezia. He gets up an average of once at night to urinate. He is not taking any medications for his bladder. He is due for a colonoscopy when he turns 68. PSA 5.6 6/2/20  PSA 1.1 3/2/21  PSA 0.3 10/13/21  PSA 0.2 4/12/22  PSA 0.1 10/14/22    RADIOLOGIC STUDIES: none new      LABORATORY STUDIES:   CBC: No results found for: WBC, RBC, HGB, HCT, MCV, MCH, MCHC, RDW, PLT, MPV  CMP:  Lab Results   Component Value Date/Time    PROT 7.1 04/07/2011 11:13 AM    LABALBU 4.6 04/07/2011 11:13 AM    BILITOT 0.5 04/07/2011 11:13 AM    ALKPHOS 112 04/07/2011 11:13 AM    AST 35 04/07/2011 11:13 AM    ALT 38 04/07/2011 11:13 AM       MEDICATIONS:   Current Outpatient Medications   Medication Sig Dispense Refill    rosuvastatin (CRESTOR) 40 MG tablet Take 1 tablet by mouth daily      Cholecalciferol (VITAMIN D3) 1.25 MG (51847 UT) TABS Take 1 tablet by mouth daily      aspirin 325 MG tablet Take 325 mg by mouth daily. No current facility-administered medications for this encounter. EXAMINATION:   Vitals:    10/21/22 1024   BP: (!) 160/90   Pulse: 78   Resp: 16   Temp: 98.2 °F (36.8 °C)   SpO2: 96%     The patient is in no acute distress. Neck: Supple no preauricular postauricular, submental, submandibular, cervical, supraclavicular, or infraclavicular lymphadenopathy is present. Heart: Regular rate and rhythm. No murmurs, clicks, or gallops are present. Lungs: Bilaterally clear to auscultation. No rales, rhonchi, or wheezing are present. Abdomen: Soft, nontender and nondistended. No hepatosplenomegaly or masses are appreciated. Extremities: No cyanosis, clubbing, or edema is present.     ASSESSMENT AND PLAN:     Nubia Post is a 78 y.o. male with a history of favorable intermediate risk prostate cancer status post definitive radiation therapy completed in December 2020 who presents for follow-up. He has no clinical or biochemical evidence of disease. He is doing well, we will see him back in 1 year with a PSA before hand for follow-up continued oncologic surveillance. The patient is to continue to follow CDC's Covid 19 precautionary measures.       Electronically signed by Sari Arce MD on 10/21/2022 at 10:36 AM

## 2022-10-21 NOTE — PROGRESS NOTES
Tellojulisa Karolinabenny  10/21/2022    Patient is seen today for follow up. Vitals:    10/21/22 1024   BP: (!) 160/90   Pulse: 78   Resp: 16   Temp: 98.2 °F (36.8 °C)   SpO2: 96%        Oxygen Therapy  SpO2: 96 %  Pulse Oximeter Device Mode: Continuous  Pulse Oximeter Device Location: Finger  O2 Device: None (Room air)  Skin Assessment: Clean, dry, & intact    Wt Readings from Last 3 Encounters:   10/21/22 196 lb (88.9 kg)   04/20/22 200 lb (90.7 kg)   10/20/21 195 lb (88.5 kg)       Pain Assessment  Pain Assessment: None - Denies Pain  Denies Need for Intervention     No Known Allergies     Current Outpatient Medications   Medication Sig Dispense Refill    rosuvastatin (CRESTOR) 40 MG tablet Take 1 tablet by mouth daily      Cholecalciferol (VITAMIN D3) 1.25 MG (75754 UT) TABS Take 1 tablet by mouth daily      aspirin 325 MG tablet Take 325 mg by mouth daily. No current facility-administered medications for this encounter.         Additional Comments: Pt presents for f/u of prostate    Electronically signed by Jessica Bernard MA on 10/21/2022 at 10:26 AM

## 2023-02-21 ENCOUNTER — CLINICAL DOCUMENTATION (OUTPATIENT)
Dept: ONCOLOGY | Age: 78
End: 2023-02-21

## 2023-10-27 ENCOUNTER — APPOINTMENT (OUTPATIENT)
Dept: RADIATION ONCOLOGY | Age: 78
End: 2023-10-27
Attending: RADIOLOGY
Payer: MEDICARE

## 2023-11-13 ENCOUNTER — HOSPITAL ENCOUNTER (OUTPATIENT)
Dept: RADIATION ONCOLOGY | Age: 78
Discharge: HOME | End: 2023-11-13
Attending: RADIOLOGY
Payer: MEDICARE

## 2023-11-13 VITALS
TEMPERATURE: 97.7 F | HEART RATE: 63 BPM | BODY MASS INDEX: 29.12 KG/M2 | SYSTOLIC BLOOD PRESSURE: 160 MMHG | HEIGHT: 69 IN | DIASTOLIC BLOOD PRESSURE: 75 MMHG | WEIGHT: 196.6 LBS

## 2023-11-13 DIAGNOSIS — C61 ADENOCARCINOMA OF PROSTATE (HCC): Primary | ICD-10-CM

## 2023-11-13 PROCEDURE — 99213 OFFICE O/P EST LOW 20 MIN: CPT | Performed by: RADIOLOGY

## 2023-11-13 PROCEDURE — 99211 OFF/OP EST MAY X REQ PHY/QHP: CPT

## 2023-11-13 NOTE — PROGRESS NOTES
[facility]  [address] [facility], [address] [facility]  Phone: [phone]  Fax: [phone]    RADIATION ONCOLOGY FOLLOW UP REPORT    PATIENT NAME:  Bevelyn Gaucher              : 1945  MEDICAL RECORD NO: [id number]    [facility] NO: [id number]        PROVIDER: Yulisa Sanchez MD      DATE OF SERVICE: 2023     DIAGNOSIS:  Cancer Staging   Adenocarcinoma of prostate [facility])  Staging form: Prostate, AJCC 8th Edition  - Clinical: Stage IIB (cT1c, cN0, cM0, PSA: 5.6, Grade Group: 2) - Unsigned       TREATMENT COURSE:   Oncology History Overview Note   PSA History:  2019  4.8  2020  5.6  2021  1.1 (3-months post-RT)  10/13/2021  0.3  2022  0.2     Adenocarcinoma of prostate ([address])   3/18/2019 Initial Diagnosis    Adenocarcinoma of prostate (HCC)    TRUS Bx  [place] 3+3=6 adenocarcinoma, 5/12 cores, 6% submitted tissue     2020 Biopsy    TRUS Bx  Single core with [place] 3+4=7 adenocarcinoma at right lateral base  Remainder showing [place] 3+3=6  6/15 cores, 8% submitted tissue       10/15/2020 - 2020 Radiation    Prostate/SV  Dual Arc VMAT-IMRT using 6MV photons  180 cGy x 30 = 5400 cGy    Prostate [place]  Dual arc VMAT-IMRT using 6MV photons  180 cGy x 14 = 2520 cGy (7920 cGy cumulative dose)       Favorable intermediate risk prostate cancer sp definitive radiation therapy completed 2020      HPI:   Bevelyn Gaucher is a 70 y.o. male who has a history as above who returns today for routine follow-up visit. He was last seen in clinic 10/21/22. He was doing well at that time and PSA was 0.1. PSA was 0.1 2023. He is not taking any medications for his bladder. He had some hematuria which was worked up by Urology. No tumor or stones, likely radiation changes. He is on aspirin but not blood thinners. Hasn't seen any blood in bowel movements. Following with Dr. Nathan Regan as well.  No dysuria, frequency, urgency, or other

## 2024-11-21 ENCOUNTER — APPOINTMENT (OUTPATIENT)
Dept: RADIATION ONCOLOGY | Age: 79
End: 2024-11-21
Attending: RADIOLOGY
Payer: MEDICARE

## 2024-11-25 ENCOUNTER — HOSPITAL ENCOUNTER (OUTPATIENT)
Dept: RADIATION ONCOLOGY | Age: 79
Discharge: HOME | End: 2024-11-25
Attending: RADIOLOGY
Payer: MEDICARE

## 2024-11-25 VITALS
OXYGEN SATURATION: 97 % | TEMPERATURE: 98.7 F | SYSTOLIC BLOOD PRESSURE: 157 MMHG | HEIGHT: 69 IN | WEIGHT: 197.6 LBS | HEART RATE: 64 BPM | BODY MASS INDEX: 29.27 KG/M2 | DIASTOLIC BLOOD PRESSURE: 82 MMHG

## 2024-11-25 DIAGNOSIS — C61 ADENOCARCINOMA OF PROSTATE (HCC): Primary | ICD-10-CM

## 2024-11-25 PROCEDURE — 99212 OFFICE O/P EST SF 10 MIN: CPT | Performed by: RADIOLOGY

## 2024-11-25 PROCEDURE — 99213 OFFICE O/P EST LOW 20 MIN: CPT | Performed by: RADIOLOGY

## 2024-11-25 NOTE — PROGRESS NOTES
[facility]   Radiation Oncology Center  [address] [place] [id number]  Phone: [phone]  Fax: [phone]    RADIATION ONCOLOGY FOLLOW UP REPORT    PATIENT NAME:  Rajendra Hernandez              : 1945  MEDICAL RECORD NO: [id number]    [facility] NO: [id number]        PROVIDER: Chacorta Thomas MD      DATE OF SERVICE: 2024     DIAGNOSIS:  Cancer Staging   Adenocarcinoma of prostate (HCC)  Staging form: Prostate, AJCC 8th Edition  - Clinical: Stage IIB (cT1c, cN0, cM0, PSA: 5.6, Grade Group: 2) - Unsigned     TREATMENT COURSE:   Oncology History Overview Note   PSA History:  2019  4.8  2020  5.6  2021  1.1 (3-months post-RT)  10/13/2021  0.3  2022  0.2     Adenocarcinoma of prostate (HCC)   3/18/2019 Initial Diagnosis    Adenocarcinoma of prostate (HCC)    TRUS Bx  [place] 3+3=6 adenocarcinoma, 5/12 cores, 6% submitted tissue     2020 Biopsy    TRUS Bx  Single core with [place] 3+4=7 adenocarcinoma at right lateral base  Remainder showing [place] 3+3=6  6/15 cores, 8% submitted tissue       10/15/2020 - 2020 Radiation    Prostate/SV  Dual Arc VMAT-IMRT using 6MV photons  180 cGy x 30 = 5400 cGy    Prostate [place]  Dual arc VMAT-IMRT using 6MV photons  180 cGy x 14 = 2520 cGy (7920 cGy cumulative dose)       Favorable IR prostate cancer sp definitive radiation therapy completed 20    HPI:   Rajendra Hernandez is a 79 y.o. male who has a history as above who returns today for routine follow-up visit.  He was last seen in clinic 23. PSA at that time was 0.1.     PSA 0.2 on 23. Testosterone 382. Not on any medications for his bladder. Still sees Dr. Morton.  No hematuria or hematochezia.  No new problems with his bowels or bladder.  No urgency or frequency.    RADIOLOGIC STUDIES:    Reviewed radiation plan    LABORATORY STUDIES:     PSA values as above.     MEDICATIONS:   Current Outpatient Medications   Medication Sig

## 2024-11-25 NOTE — PROGRESS NOTES
Rajendra Hernandez  11/25/2024    Patient is seen today for follow up.     Vitals:    11/25/24 0816   BP: (!) 157/82   Pulse: 64   Temp: 98.7 °F (37.1 °C)   SpO2: 97%        Oxygen Therapy  SpO2: 97 %  Pulse Oximetry Type: Intermittent  Pulse Oximeter Device Location: Finger    Wt Readings from Last 3 Encounters:   11/25/24 89.6 kg (197 lb 9.6 oz)   11/13/23 89.2 kg (196 lb 9.6 oz)   10/21/22 88.9 kg (196 lb)       Pain Assessment  Pain Assessment: None - Denies Pain  Denies Need for Intervention     No Known Allergies     Current Outpatient Medications   Medication Sig Dispense Refill    rosuvastatin (CRESTOR) 40 MG tablet Take 1 tablet by mouth daily      Cholecalciferol (VITAMIN D3) 1.25 MG (31425 UT) TABS Take 1 tablet by mouth daily      aspirin 325 MG tablet Take 1 tablet by mouth daily       No current facility-administered medications for this encounter.        Additional Comments: Patient states no concerns at this time.     Electronically signed by Susy Roy MA on 11/25/2024 at 8:17 AM